# Patient Record
Sex: FEMALE | Race: BLACK OR AFRICAN AMERICAN | NOT HISPANIC OR LATINO | Employment: OTHER | URBAN - METROPOLITAN AREA
[De-identification: names, ages, dates, MRNs, and addresses within clinical notes are randomized per-mention and may not be internally consistent; named-entity substitution may affect disease eponyms.]

---

## 2023-03-09 DIAGNOSIS — Z00.00 HEALTH CARE MAINTENANCE: Primary | ICD-10-CM

## 2023-03-16 RX ORDER — LACTOSE-REDUCED FOOD
LIQUID (ML) ORAL
Qty: 14220 ML | Refills: 3 | Status: SHIPPED | OUTPATIENT
Start: 2023-03-16

## 2023-04-04 DIAGNOSIS — E78.5 HYPERLIPIDEMIA, UNSPECIFIED HYPERLIPIDEMIA TYPE: Primary | ICD-10-CM

## 2023-04-04 RX ORDER — ROSUVASTATIN CALCIUM 10 MG/1
TABLET, COATED ORAL
Qty: 90 TABLET | Refills: 0 | Status: SHIPPED | OUTPATIENT
Start: 2023-04-04 | End: 2023-07-06

## 2023-05-19 ENCOUNTER — TELEPHONE (OUTPATIENT)
Dept: PRIMARY CARE | Facility: CLINIC | Age: 88
End: 2023-05-19

## 2023-05-23 DIAGNOSIS — R53.83 FATIGUE, UNSPECIFIED TYPE: Primary | ICD-10-CM

## 2023-06-01 DIAGNOSIS — E03.8 OTHER SPECIFIED HYPOTHYROIDISM: ICD-10-CM

## 2023-06-03 RX ORDER — LEVOTHYROXINE SODIUM 112 UG/1
112 TABLET ORAL DAILY
Qty: 90 TABLET | Refills: 0 | Status: SHIPPED | OUTPATIENT
Start: 2023-06-03 | End: 2023-08-30

## 2023-06-21 ENCOUNTER — TELEPHONE (OUTPATIENT)
Dept: PRIMARY CARE | Facility: CLINIC | Age: 88
End: 2023-06-21
Payer: MEDICARE

## 2023-07-03 DIAGNOSIS — E78.5 HYPERLIPIDEMIA, UNSPECIFIED HYPERLIPIDEMIA TYPE: ICD-10-CM

## 2023-07-06 RX ORDER — ROSUVASTATIN CALCIUM 10 MG/1
TABLET, COATED ORAL
Qty: 90 TABLET | Refills: 0 | Status: SHIPPED | OUTPATIENT
Start: 2023-07-06 | End: 2023-10-02

## 2023-07-14 ENCOUNTER — CLINICAL SUPPORT (OUTPATIENT)
Dept: PRIMARY CARE | Facility: CLINIC | Age: 88
End: 2023-07-14
Payer: MEDICARE

## 2023-07-14 ENCOUNTER — TELEPHONE (OUTPATIENT)
Dept: PRIMARY CARE | Facility: CLINIC | Age: 88
End: 2023-07-14

## 2023-07-14 DIAGNOSIS — R53.83 FATIGUE, UNSPECIFIED TYPE: ICD-10-CM

## 2023-07-14 DIAGNOSIS — R32 URINARY INCONTINENCE, UNSPECIFIED TYPE: Primary | ICD-10-CM

## 2023-07-14 LAB
ALANINE AMINOTRANSFERASE (SGPT) (U/L) IN SER/PLAS: 13 U/L (ref 7–45)
ALBUMIN (G/DL) IN SER/PLAS: 4.4 G/DL (ref 3.4–5)
ALKALINE PHOSPHATASE (U/L) IN SER/PLAS: 66 U/L (ref 33–136)
ANION GAP IN SER/PLAS: 15 MMOL/L (ref 10–20)
ASPARTATE AMINOTRANSFERASE (SGOT) (U/L) IN SER/PLAS: 21 U/L (ref 9–39)
BASOPHILS (10*3/UL) IN BLOOD BY AUTOMATED COUNT: 0.02 X10E9/L (ref 0–0.1)
BASOPHILS/100 LEUKOCYTES IN BLOOD BY AUTOMATED COUNT: 0.3 % (ref 0–2)
BILIRUBIN TOTAL (MG/DL) IN SER/PLAS: 0.7 MG/DL (ref 0–1.2)
CALCIUM (MG/DL) IN SER/PLAS: 10.5 MG/DL (ref 8.6–10.6)
CARBON DIOXIDE, TOTAL (MMOL/L) IN SER/PLAS: 26 MMOL/L (ref 21–32)
CHLORIDE (MMOL/L) IN SER/PLAS: 105 MMOL/L (ref 98–107)
CREATININE (MG/DL) IN SER/PLAS: 0.73 MG/DL (ref 0.5–1.05)
EOSINOPHILS (10*3/UL) IN BLOOD BY AUTOMATED COUNT: 0.1 X10E9/L (ref 0–0.4)
EOSINOPHILS/100 LEUKOCYTES IN BLOOD BY AUTOMATED COUNT: 1.6 % (ref 0–6)
ERYTHROCYTE DISTRIBUTION WIDTH (RATIO) BY AUTOMATED COUNT: 14.7 % (ref 11.5–14.5)
ERYTHROCYTE MEAN CORPUSCULAR HEMOGLOBIN CONCENTRATION (G/DL) BY AUTOMATED: 30.3 G/DL (ref 32–36)
ERYTHROCYTE MEAN CORPUSCULAR VOLUME (FL) BY AUTOMATED COUNT: 96 FL (ref 80–100)
ERYTHROCYTES (10*6/UL) IN BLOOD BY AUTOMATED COUNT: 4.11 X10E12/L (ref 4–5.2)
GFR FEMALE: 77 ML/MIN/1.73M2
GLUCOSE (MG/DL) IN SER/PLAS: 80 MG/DL (ref 74–99)
HEMATOCRIT (%) IN BLOOD BY AUTOMATED COUNT: 39.3 % (ref 36–46)
HEMOGLOBIN (G/DL) IN BLOOD: 11.9 G/DL (ref 12–16)
IMMATURE GRANULOCYTES/100 LEUKOCYTES IN BLOOD BY AUTOMATED COUNT: 0.2 % (ref 0–0.9)
LEUKOCYTES (10*3/UL) IN BLOOD BY AUTOMATED COUNT: 6.1 X10E9/L (ref 4.4–11.3)
LYMPHOCYTES (10*3/UL) IN BLOOD BY AUTOMATED COUNT: 2.55 X10E9/L (ref 0.8–3)
LYMPHOCYTES/100 LEUKOCYTES IN BLOOD BY AUTOMATED COUNT: 41.7 % (ref 13–44)
MONOCYTES (10*3/UL) IN BLOOD BY AUTOMATED COUNT: 0.56 X10E9/L (ref 0.05–0.8)
MONOCYTES/100 LEUKOCYTES IN BLOOD BY AUTOMATED COUNT: 9.2 % (ref 2–10)
NEUTROPHILS (10*3/UL) IN BLOOD BY AUTOMATED COUNT: 2.87 X10E9/L (ref 1.6–5.5)
NEUTROPHILS/100 LEUKOCYTES IN BLOOD BY AUTOMATED COUNT: 47 % (ref 40–80)
NRBC (PER 100 WBCS) BY AUTOMATED COUNT: 0 /100 WBC (ref 0–0)
PLATELETS (10*3/UL) IN BLOOD AUTOMATED COUNT: 185 X10E9/L (ref 150–450)
POTASSIUM (MMOL/L) IN SER/PLAS: 4.4 MMOL/L (ref 3.5–5.3)
PROTEIN TOTAL: 7.7 G/DL (ref 6.4–8.2)
SODIUM (MMOL/L) IN SER/PLAS: 142 MMOL/L (ref 136–145)
THYROTROPIN (MIU/L) IN SER/PLAS BY DETECTION LIMIT <= 0.05 MIU/L: 3.27 MIU/L (ref 0.44–3.98)
UREA NITROGEN (MG/DL) IN SER/PLAS: 12 MG/DL (ref 6–23)

## 2023-07-14 PROCEDURE — 80053 COMPREHEN METABOLIC PANEL: CPT

## 2023-07-14 PROCEDURE — 85025 COMPLETE CBC W/AUTO DIFF WBC: CPT

## 2023-07-14 PROCEDURE — 84443 ASSAY THYROID STIM HORMONE: CPT

## 2023-08-30 DIAGNOSIS — E03.8 OTHER SPECIFIED HYPOTHYROIDISM: ICD-10-CM

## 2023-08-30 RX ORDER — LEVOTHYROXINE SODIUM 112 UG/1
112 TABLET ORAL DAILY
Qty: 90 TABLET | Refills: 1 | Status: SHIPPED | OUTPATIENT
Start: 2023-08-30 | End: 2024-03-01 | Stop reason: SDUPTHER

## 2023-10-02 DIAGNOSIS — E78.5 HYPERLIPIDEMIA, UNSPECIFIED HYPERLIPIDEMIA TYPE: ICD-10-CM

## 2023-10-02 RX ORDER — ROSUVASTATIN CALCIUM 10 MG/1
10 TABLET, COATED ORAL DAILY
Qty: 90 TABLET | Refills: 0 | Status: SHIPPED | OUTPATIENT
Start: 2023-10-02 | End: 2024-01-03

## 2023-12-01 ENCOUNTER — OFFICE VISIT (OUTPATIENT)
Dept: PRIMARY CARE | Facility: CLINIC | Age: 88
End: 2023-12-01
Payer: MEDICARE

## 2023-12-01 VITALS
HEART RATE: 71 BPM | DIASTOLIC BLOOD PRESSURE: 64 MMHG | BODY MASS INDEX: 27.75 KG/M2 | RESPIRATION RATE: 16 BRPM | OXYGEN SATURATION: 98 % | WEIGHT: 147 LBS | SYSTOLIC BLOOD PRESSURE: 112 MMHG | HEIGHT: 61 IN

## 2023-12-01 DIAGNOSIS — I10 HYPERTENSION, UNSPECIFIED TYPE: ICD-10-CM

## 2023-12-01 DIAGNOSIS — Z00.00 MEDICARE ANNUAL WELLNESS VISIT, SUBSEQUENT: Primary | ICD-10-CM

## 2023-12-01 DIAGNOSIS — E03.9 HYPOTHYROIDISM, UNSPECIFIED TYPE: ICD-10-CM

## 2023-12-01 DIAGNOSIS — E53.8 VITAMIN B12 DEFICIENCY: ICD-10-CM

## 2023-12-01 DIAGNOSIS — Z78.0 MENOPAUSE: ICD-10-CM

## 2023-12-01 DIAGNOSIS — Z12.31 ENCOUNTER FOR SCREENING MAMMOGRAM FOR MALIGNANT NEOPLASM OF BREAST: ICD-10-CM

## 2023-12-01 DIAGNOSIS — E78.5 HYPERLIPIDEMIA, UNSPECIFIED HYPERLIPIDEMIA TYPE: ICD-10-CM

## 2023-12-01 PROBLEM — N39.46 MIXED STRESS AND URGE URINARY INCONTINENCE: Status: ACTIVE | Noted: 2023-12-01

## 2023-12-01 PROBLEM — M17.0 PRIMARY OSTEOARTHRITIS OF BOTH KNEES: Status: ACTIVE | Noted: 2022-06-17

## 2023-12-01 PROBLEM — M85.80 OSTEOPENIA: Status: ACTIVE | Noted: 2023-12-01

## 2023-12-01 PROCEDURE — 3074F SYST BP LT 130 MM HG: CPT | Performed by: SPECIALIST

## 2023-12-01 PROCEDURE — 1036F TOBACCO NON-USER: CPT | Performed by: SPECIALIST

## 2023-12-01 PROCEDURE — 1159F MED LIST DOCD IN RCRD: CPT | Performed by: SPECIALIST

## 2023-12-01 PROCEDURE — 1160F RVW MEDS BY RX/DR IN RCRD: CPT | Performed by: SPECIALIST

## 2023-12-01 PROCEDURE — 1170F FXNL STATUS ASSESSED: CPT | Performed by: SPECIALIST

## 2023-12-01 PROCEDURE — 3078F DIAST BP <80 MM HG: CPT | Performed by: SPECIALIST

## 2023-12-01 PROCEDURE — G0439 PPPS, SUBSEQ VISIT: HCPCS | Performed by: SPECIALIST

## 2023-12-01 PROCEDURE — 99397 PER PM REEVAL EST PAT 65+ YR: CPT | Performed by: SPECIALIST

## 2023-12-01 RX ORDER — AMLODIPINE BESYLATE 5 MG/1
1 TABLET ORAL DAILY
COMMUNITY
Start: 2021-12-14

## 2023-12-01 RX ORDER — CHOLECALCIFEROL (VITAMIN D3) 50 MCG
2000 TABLET ORAL
COMMUNITY
Start: 2020-11-29

## 2023-12-01 ASSESSMENT — ENCOUNTER SYMPTOMS
OCCASIONAL FEELINGS OF UNSTEADINESS: 0
LOSS OF SENSATION IN FEET: 0
DEPRESSION: 0

## 2023-12-01 ASSESSMENT — ACTIVITIES OF DAILY LIVING (ADL)
DRESSING: INDEPENDENT
DOING_HOUSEWORK: NEEDS ASSISTANCE
TAKING_MEDICATION: INDEPENDENT
GROCERY_SHOPPING: NEEDS ASSISTANCE
BATHING: INDEPENDENT
MANAGING_FINANCES: INDEPENDENT

## 2023-12-01 ASSESSMENT — PATIENT HEALTH QUESTIONNAIRE - PHQ9
SUM OF ALL RESPONSES TO PHQ9 QUESTIONS 1 AND 2: 0
SUM OF ALL RESPONSES TO PHQ9 QUESTIONS 1 AND 2: 0
1. LITTLE INTEREST OR PLEASURE IN DOING THINGS: NOT AT ALL
2. FEELING DOWN, DEPRESSED OR HOPELESS: NOT AT ALL
SUM OF ALL RESPONSES TO PHQ9 QUESTIONS 1 AND 2: 0
1. LITTLE INTEREST OR PLEASURE IN DOING THINGS: NOT AT ALL
1. LITTLE INTEREST OR PLEASURE IN DOING THINGS: NOT AT ALL
2. FEELING DOWN, DEPRESSED OR HOPELESS: NOT AT ALL
2. FEELING DOWN, DEPRESSED OR HOPELESS: NOT AT ALL

## 2023-12-01 NOTE — PATIENT INSTRUCTIONS
Recommend Covid Vaccine  Recommend RSV vaccine (separate from other vaccines by 2 weeks)  Recommend Prevnar 20   Recommend Shingrix vaccines

## 2023-12-01 NOTE — PROGRESS NOTES
Problem: At Risk for Falls  Goal: # Patient does not fall  Outcome: Outcome Not Met, Plan Adjusted  Bed alarm on, fall light turned on, fall risk sign on door, and call light within reach. Reinforced fall prevention and to call staff before getting out of bed.        Subjective   Patient ID: Moriah Quintanilla is a 89 y.o. female who presents for Medicare Annual Wellness Visit Initial.  HPI    89 yo female Pmhx sig for Hypertension, TIA 1999, Hypothyroidism, MODESTO (Not using CPAP regularly has nasal pillows), B12 Deficiency, Falls and OA Knee presents for annual wellness     Feeling ok  Walking better, knee is much better, using cane    Had bad shoulder pain and BP was up to 200 something  Wonders why on BP meds, said she never had until shoulder was bad    32 yo granddaughter wants her to live with her but she declined    Issue with daughter right now so sticky family matters  Walking in house  Walking with rollator 1-2 x a week weather permitting outside  Uses shower stool    Does water exercise also     No Known Allergies   Current Outpatient Medications   Medication Instructions    amLODIPine (Norvasc) 5 mg tablet 1 tablet, oral, Daily    cholecalciferol (VITAMIN D-3) 2,000 Units, oral    incontinence pad, liner, disp pad 1 each, miscellaneous, 4 times daily PRN    levothyroxine (SYNTHROID, LEVOXYL) 112 mcg, oral, Daily    nutritional drink (Ensure) liquid DRINK ONE CAN IN THE MORNING AND ONE IN THE EVENING    rosuvastatin (CRESTOR) 10 mg, oral, Daily        Review of Systems  Constitutional  No fatigue, no fevers, no chills, no unintentional weight loss,   HEENT:  No headaches, no dizziness, no double vision, no blurred vision, eye exams current decreased right hearing  Cardiovascular:  No chest pain, no palpitations, no shortness of breath with exertion (walking a city block),   Respiratory:  No cough, no hemoptysis, No shortness of breath at rest  GI:  No dysphagia, no odynophagia, no reflux, no abdominal pain, no nausea, no vomiting, no changes in bowel habits, no bright red blood per rectum, no melena takes metamucil due to constipation  :  No urinary frequency, no dysuria, Positive using depends urine incontinence  MSK:  No falls, no joint pain, no joint swelling  Neuro:  No  "tremors, no extremity weakness, no changes in sensation    Physical Exam  /64   Pulse 71   Resp 16   Ht 1.549 m (5' 1\")   Wt 66.7 kg (147 lb)   SpO2 98%   BMI 27.78 kg/m²   General:    Well-appearing  F in no acute distress, well nourished, well hydrated  Head:  Normocephalic, atraumatic  Skin:          Warm dry,   Eyes:  Anicteric sclera, pupils equal,   Ears:        TMs intact  Oral:      Not examined due to pandemic  Neck:   Supple, no cervical/supraclavicular adenopathy, no thyromegaly or nodules appreciated on exam  Cor:      Regular rate, normal S1, S2, no murmurs appreciated, no S3, no S4   Lungs:   Clear to auscultation b/l, no wheezes, no rhonchi, no crackles, no accessory respiratory muscle use  Abd:          Soft, nontender, no guarding, no rebound, no hepatosplenomegaly appreciated   Ext:            No lower extremity edema, no palpable cords  Pulses:      Pedal pulses intact  Neuro:   CN2-12 grossly intact   Breasts:     Not examined declined     Assessment/Plan   Problem List Items Addressed This Visit       Vitamin B12 deficiency     Not currently taking b12  Labs ordered         Relevant Orders    Vitamin B12    Hypothyroidism     On 112 mcgs levothyroxine  Labs ordered           Relevant Orders    Thyroid Stimulating Hormone    Hyperlipidemia     On Crestor 10 mgdaily  Labs ordered          Relevant Orders    Comprehensive Metabolic Panel    Lipid Panel    Medicare annual wellness visit, subsequent - Primary     Previously received annual influenza vcaccine  Recommend RSV vaccine  Recommended Covid vaccine  Prior Tdap 2020  Recommend Shingrix vaccines  Previously received PCV13 in 12/2018  Recommended Prevnar 20   Mammogram 3/14/2022 ordered  DXA 3/12/2021 osteopenia           Relevant Orders    CBC    Comprehensive Metabolic Panel    Lipid Panel    Thyroid Stimulating Hormone    Vitamin B12    Hypertension     Well controlled on current medication         Relevant Orders    CBC    " Comprehensive Metabolic Panel     Other Visit Diagnoses       Menopause        Relevant Orders    XR DEXA bone density    Encounter for screening mammogram for malignant neoplasm of breast        Relevant Orders    BI mammo bilateral screening tomosynthesis             Susu Sierra DO

## 2023-12-12 PROBLEM — I10 HYPERTENSION: Status: ACTIVE | Noted: 2023-12-12

## 2023-12-13 NOTE — ASSESSMENT & PLAN NOTE
Previously received annual influenza vcaccine  Recommend RSV vaccine  Recommended Covid vaccine  Prior Tdap 2020  Recommend Shingrix vaccines  Previously received PCV13 in 12/2018  Recommended Prevnar 20   Mammogram 3/14/2022 ordered  DXA 3/12/2021 osteopenia

## 2024-01-01 DIAGNOSIS — E78.5 HYPERLIPIDEMIA, UNSPECIFIED HYPERLIPIDEMIA TYPE: ICD-10-CM

## 2024-01-03 RX ORDER — ROSUVASTATIN CALCIUM 10 MG/1
10 TABLET, COATED ORAL DAILY
Qty: 90 TABLET | Refills: 3 | Status: SHIPPED | OUTPATIENT
Start: 2024-01-03 | End: 2024-03-01 | Stop reason: SDUPTHER

## 2024-01-19 ENCOUNTER — APPOINTMENT (OUTPATIENT)
Dept: SLEEP MEDICINE | Facility: CLINIC | Age: 89
End: 2024-01-19
Payer: MEDICARE

## 2024-03-01 DIAGNOSIS — E03.8 OTHER SPECIFIED HYPOTHYROIDISM: ICD-10-CM

## 2024-03-01 DIAGNOSIS — E78.5 HYPERLIPIDEMIA, UNSPECIFIED HYPERLIPIDEMIA TYPE: ICD-10-CM

## 2024-03-01 RX ORDER — ROSUVASTATIN CALCIUM 10 MG/1
10 TABLET, COATED ORAL DAILY
Qty: 90 TABLET | Refills: 0 | Status: SHIPPED | OUTPATIENT
Start: 2024-03-01 | End: 2024-05-31

## 2024-03-01 RX ORDER — LEVOTHYROXINE SODIUM 112 UG/1
112 TABLET ORAL DAILY
Qty: 90 TABLET | Refills: 0 | Status: SHIPPED | OUTPATIENT
Start: 2024-03-01 | End: 2024-05-31

## 2024-03-18 ENCOUNTER — APPOINTMENT (OUTPATIENT)
Dept: RADIOLOGY | Facility: CLINIC | Age: 89
End: 2024-03-18
Payer: MEDICARE

## 2024-04-03 ENCOUNTER — TELEPHONE (OUTPATIENT)
Dept: PRIMARY CARE | Facility: CLINIC | Age: 89
End: 2024-04-03

## 2024-04-03 NOTE — TELEPHONE ENCOUNTER
Patient would like to know if you could recommend an inexpensive thyroid medication for her next refill. The last refill she had picked up in [place] was 35 dollars, she usually pays 9 dollars. I also told her it may be because she was [place] when she got it.

## 2024-04-08 ENCOUNTER — HOSPITAL ENCOUNTER (OUTPATIENT)
Dept: RADIOLOGY | Facility: CLINIC | Age: 89
Discharge: HOME | End: 2024-04-08
Payer: MEDICARE

## 2024-04-08 DIAGNOSIS — Z78.0 MENOPAUSE: ICD-10-CM

## 2024-04-08 DIAGNOSIS — Z12.31 ENCOUNTER FOR SCREENING MAMMOGRAM FOR MALIGNANT NEOPLASM OF BREAST: ICD-10-CM

## 2024-04-08 PROCEDURE — 77080 DXA BONE DENSITY AXIAL: CPT

## 2024-04-08 PROCEDURE — 77080 DXA BONE DENSITY AXIAL: CPT | Performed by: RADIOLOGY

## 2024-04-10 ENCOUNTER — HOSPITAL ENCOUNTER (OUTPATIENT)
Dept: RADIOLOGY | Facility: CLINIC | Age: 89
Discharge: HOME | End: 2024-04-10
Payer: MEDICARE

## 2024-04-10 VITALS — BODY MASS INDEX: 27.76 KG/M2 | HEIGHT: 61 IN | WEIGHT: 147.05 LBS

## 2024-04-10 PROCEDURE — 77063 BREAST TOMOSYNTHESIS BI: CPT | Performed by: RADIOLOGY

## 2024-04-10 PROCEDURE — 77067 SCR MAMMO BI INCL CAD: CPT | Performed by: RADIOLOGY

## 2024-04-10 PROCEDURE — 77067 SCR MAMMO BI INCL CAD: CPT

## 2024-04-18 ENCOUNTER — APPOINTMENT (OUTPATIENT)
Dept: SLEEP MEDICINE | Facility: HOSPITAL | Age: 89
End: 2024-04-18
Payer: MEDICARE

## 2024-05-01 ENCOUNTER — TELEPHONE (OUTPATIENT)
Dept: PRIMARY CARE | Facility: CLINIC | Age: 89
End: 2024-05-01

## 2024-05-01 DIAGNOSIS — R26.9 GAIT ABNORMALITY: Primary | ICD-10-CM

## 2024-05-01 NOTE — TELEPHONE ENCOUNTER
Patient would like prescription for handicap placard. She is unable to walk 200 ft. Without stopping and uses a cane and rolator device.

## 2024-05-29 DIAGNOSIS — E78.5 HYPERLIPIDEMIA, UNSPECIFIED HYPERLIPIDEMIA TYPE: ICD-10-CM

## 2024-05-29 DIAGNOSIS — E03.8 OTHER SPECIFIED HYPOTHYROIDISM: ICD-10-CM

## 2024-05-31 RX ORDER — LEVOTHYROXINE SODIUM 112 UG/1
112 TABLET ORAL DAILY
Qty: 90 TABLET | Refills: 1 | Status: SHIPPED | OUTPATIENT
Start: 2024-05-31

## 2024-05-31 RX ORDER — ROSUVASTATIN CALCIUM 10 MG/1
10 TABLET, COATED ORAL DAILY
Qty: 90 TABLET | Refills: 1 | Status: SHIPPED | OUTPATIENT
Start: 2024-05-31

## 2024-07-10 ENCOUNTER — APPOINTMENT (OUTPATIENT)
Dept: SLEEP MEDICINE | Facility: CLINIC | Age: 89
End: 2024-07-10
Payer: MEDICARE

## 2024-07-10 VITALS
HEART RATE: 71 BPM | HEIGHT: 60 IN | BODY MASS INDEX: 29.84 KG/M2 | RESPIRATION RATE: 16 BRPM | SYSTOLIC BLOOD PRESSURE: 133 MMHG | WEIGHT: 152 LBS | DIASTOLIC BLOOD PRESSURE: 67 MMHG | OXYGEN SATURATION: 96 %

## 2024-07-10 DIAGNOSIS — G47.33 OSA (OBSTRUCTIVE SLEEP APNEA): ICD-10-CM

## 2024-07-10 DIAGNOSIS — I10 HYPERTENSION, UNSPECIFIED TYPE: Primary | ICD-10-CM

## 2024-07-10 PROCEDURE — 3078F DIAST BP <80 MM HG: CPT | Performed by: STUDENT IN AN ORGANIZED HEALTH CARE EDUCATION/TRAINING PROGRAM

## 2024-07-10 PROCEDURE — G2211 COMPLEX E/M VISIT ADD ON: HCPCS | Performed by: STUDENT IN AN ORGANIZED HEALTH CARE EDUCATION/TRAINING PROGRAM

## 2024-07-10 PROCEDURE — 1159F MED LIST DOCD IN RCRD: CPT | Performed by: STUDENT IN AN ORGANIZED HEALTH CARE EDUCATION/TRAINING PROGRAM

## 2024-07-10 PROCEDURE — 3075F SYST BP GE 130 - 139MM HG: CPT | Performed by: STUDENT IN AN ORGANIZED HEALTH CARE EDUCATION/TRAINING PROGRAM

## 2024-07-10 PROCEDURE — 1036F TOBACCO NON-USER: CPT | Performed by: STUDENT IN AN ORGANIZED HEALTH CARE EDUCATION/TRAINING PROGRAM

## 2024-07-10 PROCEDURE — 1160F RVW MEDS BY RX/DR IN RCRD: CPT | Performed by: STUDENT IN AN ORGANIZED HEALTH CARE EDUCATION/TRAINING PROGRAM

## 2024-07-10 PROCEDURE — 99214 OFFICE O/P EST MOD 30 MIN: CPT | Performed by: STUDENT IN AN ORGANIZED HEALTH CARE EDUCATION/TRAINING PROGRAM

## 2024-07-10 ASSESSMENT — ENCOUNTER SYMPTOMS
RESPIRATORY NEGATIVE: 1
PSYCHIATRIC NEGATIVE: 1
NEUROLOGICAL NEGATIVE: 1
CARDIOVASCULAR NEGATIVE: 1
CONSTITUTIONAL NEGATIVE: 1

## 2024-07-10 NOTE — ASSESSMENT & PLAN NOTE
BP Readings from Last 1 Encounters:   07/10/24 133/67     - doing well, asymptomatic  - discussed at length the impact of untreated MODESTO and BP control  - continue current management and follow-up with PCP

## 2024-07-10 NOTE — ASSESSMENT & PLAN NOTE
Has her machine. Did well initially and found it helpful, then she went to [place] for 4 months and forgot to bring it with her, then she just stopped using  Resumed about 2 nights ago and found it very help  Ready to start again

## 2024-07-10 NOTE — PROGRESS NOTES
Patient: Moriah Quintanilla    [id number]  : 8/3/1931 -- AGE 89 y.o.    Provider: Js Rondon MD     Location [facility]   Service Date: 7/10/2024              [facility]  Followup Visit Note    HISTORY OF PRESENT ILLNESS     HISTORY OF PRESENT ILLNESS   Moriah Quintanilla is a 89 y.o. female with h/o Hypertension and MODESTO who presents to a [facility] for followup.     Assessment and plan from last visit: 2023    89 year old female here for sleep consultation     # MODESTO, moderate (RDI 16.9 on PSG 2023)  - PSG reviewed and treatement options discussed in detail with patient  - Start auto PAP 4-10 CWP through [facility]  - patient voiced understanding  - overall very functional 89 yo. We will obtain the study first then evaluate to see if we want to treat  - she will bring her old machine with her to f/u visit with me in 3 weeks     # HTN  - /77 in clinic  - doing well, asymptomatic  - discussed at length the impact of untreated MODESTO and BP control  - continue current management and follow-up with PCP     # Obesity  - current BMI at ~ 30  - encouraged healthy weight loss via diet and exercise     RTC in 3 months     Current History    On today's visit, the patient reports that she did well with the CPAP therapy initially, then went to [facility] to visit her son and stayed there for 4 months without her CPAP.  She didn't resume using the therapy after she got back until 2 nights ago where she found it very beneficial.  She wants to start again.    PAP Info  DURABLE MEDICAL EQUIPMENT COMPANY: [employer]  Machine: THERAPY: RESMED AIRSENSE 11  4 cm H2O - 10 cm H2O  Issues with therapy: ISSUES WITH THERAPY: None  Benefits with PAP: PERCEIVED BENEFITS OF PAP: refreshing sleep    RLS Followup:   none.    Daytime Symptoms    Patient reports DAYTIME SYMPTOMS: no daytime symptoms  Patient denies daytime symptoms including: Denies:  excessive daytime sleepiness    Naps: No  Fatigue: denies feeling fatigue      REVIEW OF SYSTEMS     REVIEW OF SYSTEMS  Review of Systems   Constitutional: Negative.    HENT: Negative.     Respiratory: Negative.     Cardiovascular: Negative.    Genitourinary: Negative.    Skin: Negative.    Neurological: Negative.    Psychiatric/Behavioral: Negative.           ALLERGIES AND MEDICATIONS     ALLERGIES  No Known Allergies    MEDICATIONS: She has a current medication list which includes the following prescription(s): amlodipine - Take 1 tablet (5 mg) by mouth once daily, cholecalciferol - Take 1 tablet (2,000 Units) by mouth, incontinence pad, liner, disp - 1 each 4 times a day as needed (incontnence), levothyroxine - Take 1 tablet (112 mcg) by mouth once daily, ensure - DRINK ONE CAN IN THE MORNING AND ONE IN THE EVENING, and rosuvastatin - Take 1 tablet (10 mg) by mouth once daily.    PAST MEDICAL HISTORY : She  has a past medical history of Acute upper respiratory infection, unspecified (07/25/2019), Age-related osteoporosis without current pathological fracture (02/25/2021), Encounter for examination of ears and hearing without abnormal findings (09/30/2019), Encounter for immunization, Encounter for screening mammogram for malignant neoplasm of breast (04/25/2017), Localized edema (04/11/2022), Localized edema (05/19/2022), Obstructive sleep apnea (adult) (pediatric) (01/13/2020), Olecranon bursitis, unspecified elbow (11/29/2020), Other specified abnormal findings of blood chemistry (12/30/2021), Pain in unspecified knee (10/18/2022), Personal history of other diseases of the nervous system and sense organs (07/28/2019), Personal history of other drug therapy (12/05/2018), Personal history of other drug therapy (10/13/2016), Personal history of other drug therapy (10/26/2018), Personal history of other endocrine, nutritional and metabolic disease (04/25/2013), Personal history of other infectious and parasitic  diseases (04/12/2020), Personal history of other mental and behavioral disorders (04/25/2013), Personal history of other specified conditions (09/21/2016), Personal history of other specified conditions (04/02/2020), Personal history of other specified conditions (03/22/2021), Personal history of urinary (tract) infections (06/18/2018), Personal history of urinary (tract) infections (08/27/2021), Personal history of urinary (tract) infections (03/12/2013), Rash and other nonspecific skin eruption (10/13/2016), Strain of muscle, fascia and tendon at neck level, initial encounter (01/15/2019), Strain of right quadriceps muscle, fascia and tendon, initial encounter (10/17/2022), Unilateral post-traumatic osteoarthritis, left knee (10/31/2018), Unspecified fall, initial encounter (10/31/2022), Unspecified internal derangement of left knee (04/02/2020), and Zoster without complications (10/06/2015).    PAST SURGICAL HISTORY: She  has a past surgical history that includes Other surgical history (12/05/2018) and Ankle surgery (07/18/2016).     FAMILY HISTORY: No changes since previous visit. Otherwise non-contributory as charted.     SOCIAL HISTORY  She  reports that she has never smoked. She has never used smokeless tobacco. She reports current alcohol use of about 1.0 standard drink of alcohol per week. She reports that she does not use drugs.       PHYSICAL EXAM     VITAL SIGNS: /67 (BP Location: Left arm, Patient Position: Sitting, BP Cuff Size: Adult)   Pulse 71   Resp 16   Ht 1.524 m (5')   Wt 68.9 kg (152 lb)   SpO2 96%   BMI 29.69 kg/m²      PREVIOUS WEIGHTS:  Wt Readings from Last 3 Encounters:   07/10/24 68.9 kg (152 lb)   04/10/24 66.7 kg (147 lb 0.8 oz)   12/01/23 66.7 kg (147 lb)         RESULTS/DATA     Bicarbonate (mmol/L)   Date Value   07/14/2023 26   10/31/2022 28   03/01/2021 27       PAP Adherence  A PAP adherence download was obtained and data was reviewed personally today in  clinic.        ASSESSMENT/PLAN     Ms. Quintanilla is a 89 y.o. female and she returns in followup to the [facility] for MODESTO.    Problem List, Orders, Assessment, Recommendations:  Problem List Items Addressed This Visit             ICD-10-CM    Hypertension - Primary I10     BP Readings from Last 1 Encounters:   07/10/24 133/67     - doing well, asymptomatic  - discussed at length the impact of untreated MODESTO and BP control  - continue current management and follow-up with PCP          MODESTO (obstructive sleep apnea) G47.33     Has her machine. Did well initially and found it helpful, then she went to [place] for 4 months and forgot to bring it with her, then she just stopped using  Resumed about 2 nights ago and found it very help  Ready to start again         Relevant Orders    Positive Airway Pressure (PAP) Therapy       Disposition    Return to clinic in 4 months

## 2024-07-15 ENCOUNTER — APPOINTMENT (OUTPATIENT)
Dept: ORTHOPEDIC SURGERY | Facility: CLINIC | Age: 89
End: 2024-07-15
Payer: MEDICARE

## 2024-07-16 ENCOUNTER — APPOINTMENT (OUTPATIENT)
Dept: ORTHOPEDIC SURGERY | Facility: CLINIC | Age: 89
End: 2024-07-16
Payer: MEDICARE

## 2024-07-17 ENCOUNTER — HOSPITAL ENCOUNTER (OUTPATIENT)
Dept: RADIOLOGY | Facility: CLINIC | Age: 89
Discharge: HOME | End: 2024-07-17
Payer: MEDICARE

## 2024-07-17 ENCOUNTER — OFFICE VISIT (OUTPATIENT)
Dept: ORTHOPEDIC SURGERY | Facility: CLINIC | Age: 89
End: 2024-07-17
Payer: MEDICARE

## 2024-07-17 DIAGNOSIS — M54.2 NECK PAIN: ICD-10-CM

## 2024-07-17 DIAGNOSIS — M54.12 CERVICAL RADICULOPATHY: ICD-10-CM

## 2024-07-17 PROCEDURE — 72050 X-RAY EXAM NECK SPINE 4/5VWS: CPT

## 2024-07-17 PROCEDURE — 72050 X-RAY EXAM NECK SPINE 4/5VWS: CPT | Performed by: RADIOLOGY

## 2024-07-17 NOTE — PROGRESS NOTES
Moriah is a 89-year-old female that presents today with her daughter to be evaluated for a cracking noise in her neck.    She denies any injury or fall.  She does not have any upper extremity radiculopathy or weakness.  No neck pain.  She has full control of her bowel and bladder to extent with known incontinence that is being worked up.  She is doing Kegel exercises.  She ambulates without assistance.    She is an extremely healthy and active 89-year-old.    From a treatment standpoint she has not done any physical therapy for this.    Family, social, and medical histories are obtained and reviewed.    I reviewed the complete 30-point review of systems that was documented on the scanned patient intake form.  All other systems are non-contributory except as defined in history of present illness.    Const: Well-appearing, well-nourished female in no distress.  Eyes: Normal appearing sclera and conjunctiva, no jaundice, pupils normal in appearance.  Neck: No masses or lymphadenopathy appreciated.  Resp: breathing comfortably, normal respiratory rate rate.  CV: No upper or lower extremity edema.  Musculoskeletal: Normal gait.  Able to heel/toe walk without difficulty.  Cervical ROM is supple.  Strength exam of the upper extremities reveals 5/5 strength in all major muscle groups .  No intrinsic wasting.  Negative Spurling sign.    Neuro: Sensation is intact and equal bilaterally. Deep tendon reflexes are normal and symmetric.  No clonus, negative Reaves sign, negative Lhermitte's sign  Skin: Intact without any lesions, normal turgor.  Psych: Alert and oriented x3, normal mood and affect.    We will obtain x-rays of her cervical spine after the visit just to ensure from a structural standpoint that everything is okay but I did reassure her that most likely this is more of a arthritic, degenerative change condition.    Moving forward she was given a referral for physical therapy for general exercising and strengthening  and will follow-up with us as needed.    This note was dictated using speech recognition software and was not corrected for spelling or grammatical errors

## 2024-08-01 ENCOUNTER — APPOINTMENT (OUTPATIENT)
Dept: PRIMARY CARE | Facility: CLINIC | Age: 89
End: 2024-08-01
Payer: MEDICARE

## 2024-09-19 ENCOUNTER — HOSPITAL ENCOUNTER (EMERGENCY)
Facility: HOSPITAL | Age: 89
Discharge: HOME | End: 2024-09-19
Payer: MEDICARE

## 2024-09-19 ENCOUNTER — APPOINTMENT (OUTPATIENT)
Dept: RADIOLOGY | Facility: HOSPITAL | Age: 89
End: 2024-09-19
Payer: MEDICARE

## 2024-09-19 VITALS
DIASTOLIC BLOOD PRESSURE: 82 MMHG | HEART RATE: 67 BPM | TEMPERATURE: 98.7 F | RESPIRATION RATE: 18 BRPM | SYSTOLIC BLOOD PRESSURE: 158 MMHG | WEIGHT: 140 LBS | BODY MASS INDEX: 26.43 KG/M2 | OXYGEN SATURATION: 98 % | HEIGHT: 61 IN

## 2024-09-19 DIAGNOSIS — S09.90XA HEAD INJURY, INITIAL ENCOUNTER: Primary | ICD-10-CM

## 2024-09-19 PROCEDURE — 70450 CT HEAD/BRAIN W/O DYE: CPT | Performed by: RADIOLOGY

## 2024-09-19 PROCEDURE — 99285 EMERGENCY DEPT VISIT HI MDM: CPT | Mod: 25

## 2024-09-19 PROCEDURE — 72125 CT NECK SPINE W/O DYE: CPT

## 2024-09-19 PROCEDURE — 70450 CT HEAD/BRAIN W/O DYE: CPT

## 2024-09-19 PROCEDURE — 72125 CT NECK SPINE W/O DYE: CPT | Performed by: RADIOLOGY

## 2024-09-19 RX ORDER — ACETAMINOPHEN 325 MG/1
975 TABLET ORAL ONCE
Status: COMPLETED | OUTPATIENT
Start: 2024-09-19 | End: 2024-09-19

## 2024-09-19 RX ORDER — ACETAMINOPHEN 500 MG
1000 TABLET ORAL 2 TIMES DAILY
Qty: 28 TABLET | Refills: 0 | Status: SHIPPED | OUTPATIENT
Start: 2024-09-19 | End: 2024-09-26

## 2024-09-19 RX ADMIN — ACETAMINOPHEN 975 MG: 325 TABLET ORAL at 13:29

## 2024-09-19 ASSESSMENT — COLUMBIA-SUICIDE SEVERITY RATING SCALE - C-SSRS
2. HAVE YOU ACTUALLY HAD ANY THOUGHTS OF KILLING YOURSELF?: NO
1. IN THE PAST MONTH, HAVE YOU WISHED YOU WERE DEAD OR WISHED YOU COULD GO TO SLEEP AND NOT WAKE UP?: NO
6. HAVE YOU EVER DONE ANYTHING, STARTED TO DO ANYTHING, OR PREPARED TO DO ANYTHING TO END YOUR LIFE?: NO

## 2024-09-19 ASSESSMENT — PAIN - FUNCTIONAL ASSESSMENT
PAIN_FUNCTIONAL_ASSESSMENT: 0-10
PAIN_FUNCTIONAL_ASSESSMENT: 0-10

## 2024-09-19 ASSESSMENT — PAIN SCALES - GENERAL
PAINLEVEL_OUTOF10: 1
PAINLEVEL_OUTOF10: 0 - NO PAIN

## 2024-09-19 NOTE — ED PROVIDER NOTES
"HPI   Chief Complaint   Patient presents with    Head Injury       89-year-old female hit her head on the top of refrigerator door 5 days ago.  There was swelling at the top of her head and she went and saw her pharmacist who recommended putting a wet dish cloth in the microwave and heating it up and then putting a plastic bag and putting it on the top of her head.  Swelling did in fact improving go down.  She still has tenderness at this site.  When it first swelled it was a size of a \"cashew\".  She denies visual change.  She denies any neurologic deficit.  She denies nausea or vomiting.  She denies vision change.  She denies weakness.  She denies posterior neck pain.  She denies paresthesia of upper or lower extremities.  She denies chest pain, dyspnea, abdominal pain, nausea or vomiting and her 80-year-old son is bedside with her.  She is not on any anticoagulant medication.      History provided by:  Patient   used: No            Patient History   Past Medical History:   Diagnosis Date    Acute upper respiratory infection, unspecified 07/25/2019    Viral upper respiratory tract infection    Age-related osteoporosis without current pathological fracture 02/25/2021    Age related osteoporosis    Encounter for examination of ears and hearing without abnormal findings 09/30/2019    Encounter for audiology evaluation    Encounter for immunization     Encounter for immunization    Encounter for screening mammogram for malignant neoplasm of breast 04/25/2017    Screening mammogram for high-risk patient    Localized edema 04/11/2022    Unilateral edema of lower extremity    Localized edema 05/19/2022    Leg edema    Obstructive sleep apnea (adult) (pediatric) 01/13/2020    MODESTO on CPAP    Olecranon bursitis, unspecified elbow 11/29/2020    Olecranon bursitis    Other specified abnormal findings of blood chemistry 12/30/2021    Abnormal thyroid blood test    Pain in unspecified knee 10/18/2022    Knee " pain    Personal history of other diseases of the nervous system and sense organs 07/28/2019    History of conjunctivitis    Personal history of other drug therapy 12/05/2018    History of pneumococcal vaccination    Personal history of other drug therapy 10/13/2016    History of influenza vaccination    Personal history of other drug therapy 10/26/2018    History of influenza vaccination    Personal history of other endocrine, nutritional and metabolic disease 04/25/2013    History of hypothyroidism    Personal history of other infectious and parasitic diseases 04/12/2020    History of Clostridioides difficile colitis    Personal history of other mental and behavioral disorders 04/25/2013    History of depression    Personal history of other specified conditions 09/21/2016    History of urinary urgency    Personal history of other specified conditions 04/02/2020    History of paresthesia    Personal history of other specified conditions 03/22/2021    History of palpitations    Personal history of urinary (tract) infections 06/18/2018    History of urinary tract infection    Personal history of urinary (tract) infections 08/27/2021    History of urinary tract infection    Personal history of urinary (tract) infections 03/12/2013    History of urinary tract infection    Rash and other nonspecific skin eruption 10/13/2016    Rash    Strain of muscle, fascia and tendon at neck level, initial encounter 01/15/2019    Neck strain    Strain of right quadriceps muscle, fascia and tendon, initial encounter 10/17/2022    Strain of quadriceps, right, initial encounter    Unilateral post-traumatic osteoarthritis, left knee 10/31/2018    Post-traumatic osteoarthritis of left knee    Unspecified fall, initial encounter 10/31/2022    Fall, accidental    Unspecified internal derangement of left knee 04/02/2020    Internal derangement of left knee    Zoster without complications 10/06/2015    Herpes zoster without complication      Past Surgical History:   Procedure Laterality Date    ANKLE SURGERY  07/18/2016    Ankle Surgery    OTHER SURGICAL HISTORY  12/05/2018    Tonsillectomy     Family History   Problem Relation Name Age of Onset    Diabetes Mother      Coronary artery disease Father      Diabetes Brother       Social History     Tobacco Use    Smoking status: Never    Smokeless tobacco: Never   Vaping Use    Vaping status: Never Used   Substance Use Topics    Alcohol use: Yes     Alcohol/week: 1.0 standard drink of alcohol     Types: 1 Glasses of wine per week     Comment: rare small glass of blackberry [place]    Drug use: Never       Physical Exam   ED Triage Vitals [09/19/24 1206]   Temperature Heart Rate Respirations BP   37.1 °C (98.7 °F) 64 18 (!) 170/91      Pulse Ox Temp Source Heart Rate Source Patient Position   99 % Oral Monitor --      BP Location FiO2 (%)     -- --       Physical Exam  Constitutional:       Appearance: Normal appearance.   HENT:      Head: Normocephalic and atraumatic.      Right Ear: Tympanic membrane normal.      Left Ear: Tympanic membrane normal.      Nose: Nose normal.      Mouth/Throat:      Mouth: Mucous membranes are dry.   Eyes:      Extraocular Movements: Extraocular movements intact.      Pupils: Pupils are equal, round, and reactive to light.   Cardiovascular:      Rate and Rhythm: Normal rate and regular rhythm.      Pulses: Normal pulses.      Heart sounds: Normal heart sounds.   Pulmonary:      Effort: Pulmonary effort is normal.      Breath sounds: Normal breath sounds.   Abdominal:      General: Abdomen is flat.      Palpations: Abdomen is soft.   Musculoskeletal:         General: Normal range of motion.      Cervical back: Normal range of motion and neck supple.   Skin:     General: Skin is warm.      Capillary Refill: Capillary refill takes less than 2 seconds.   Neurological:      General: No focal deficit present.      Mental Status: She is alert and oriented to person, place,  and time.      Cranial Nerves: No cranial nerve deficit.      Sensory: No sensory deficit.      Motor: No weakness.      Coordination: Coordination normal.      Gait: Gait normal.   Psychiatric:         Mood and Affect: Mood normal.         Behavior: Behavior normal.           ED Course & MDM   Diagnoses as of 09/19/24 1506   Head injury, initial encounter                 No data recorded     Boni Coma Scale Score: 15 (09/19/24 1207 : Rob Narayanan RN)       NIH Stroke Scale: 0 (09/19/24 1242 : AMITA Dowell)                   Medical Decision Making  Physical exam was normal and NIH was 0.  Due to the nature of the head injury and patient describing a throbbing headache CT head and neck was ordered.  She received 975 acetaminophen.CT head and neck were negative for acute findings.  Patient responded well to acetaminophen.  She can use acetaminophen twice daily.  Follow-up with PCP as needed.  Return precautions were reviewed and if she develops acute headache, nausea or vomiting, or confusion it is 911 back to our emergency department.    Amount and/or Complexity of Data Reviewed  Radiology: ordered and independent interpretation performed.        Procedure  Procedures     AMITA Dowell  09/19/24 1506

## 2024-09-19 NOTE — ED TRIAGE NOTES
Pt arrives to ED for head injury that happened on Sunday. Pt was standing up and hit her head on the refrigerator door. Pt denies blood thinners or LOC. Pt states she noticed a lump on the top of her head and wanted a scan to make sure everything was okay.

## 2024-09-19 NOTE — DISCHARGE INSTRUCTIONS
Watch for any sign of nausea vomiting, confusion, or worst headache of your life.  If that occurs it is 911 immediately back to our ED.  Please use acetaminophen 1000 mg twice daily as needed for headache and pain.

## 2024-09-25 ENCOUNTER — APPOINTMENT (OUTPATIENT)
Dept: SLEEP MEDICINE | Facility: CLINIC | Age: 89
End: 2024-09-25
Payer: MEDICARE

## 2024-09-25 VITALS
BODY MASS INDEX: 28.51 KG/M2 | DIASTOLIC BLOOD PRESSURE: 77 MMHG | OXYGEN SATURATION: 96 % | SYSTOLIC BLOOD PRESSURE: 148 MMHG | HEIGHT: 61 IN | WEIGHT: 151 LBS | HEART RATE: 71 BPM

## 2024-09-25 DIAGNOSIS — G47.33 OSA (OBSTRUCTIVE SLEEP APNEA): Primary | ICD-10-CM

## 2024-09-25 DIAGNOSIS — I10 HYPERTENSION, UNSPECIFIED TYPE: ICD-10-CM

## 2024-09-25 PROCEDURE — 1160F RVW MEDS BY RX/DR IN RCRD: CPT | Performed by: STUDENT IN AN ORGANIZED HEALTH CARE EDUCATION/TRAINING PROGRAM

## 2024-09-25 PROCEDURE — G2211 COMPLEX E/M VISIT ADD ON: HCPCS | Performed by: STUDENT IN AN ORGANIZED HEALTH CARE EDUCATION/TRAINING PROGRAM

## 2024-09-25 PROCEDURE — 99214 OFFICE O/P EST MOD 30 MIN: CPT | Performed by: STUDENT IN AN ORGANIZED HEALTH CARE EDUCATION/TRAINING PROGRAM

## 2024-09-25 PROCEDURE — 3077F SYST BP >= 140 MM HG: CPT | Performed by: STUDENT IN AN ORGANIZED HEALTH CARE EDUCATION/TRAINING PROGRAM

## 2024-09-25 PROCEDURE — 1036F TOBACCO NON-USER: CPT | Performed by: STUDENT IN AN ORGANIZED HEALTH CARE EDUCATION/TRAINING PROGRAM

## 2024-09-25 PROCEDURE — 1159F MED LIST DOCD IN RCRD: CPT | Performed by: STUDENT IN AN ORGANIZED HEALTH CARE EDUCATION/TRAINING PROGRAM

## 2024-09-25 PROCEDURE — 3078F DIAST BP <80 MM HG: CPT | Performed by: STUDENT IN AN ORGANIZED HEALTH CARE EDUCATION/TRAINING PROGRAM

## 2024-09-25 ASSESSMENT — SLEEP AND FATIGUE QUESTIONNAIRES
HOW LIKELY ARE YOU TO NOD OFF OR FALL ASLEEP WHEN YOU ARE A PASSENGER IN A CAR FOR AN HOUR WITHOUT A BREAK: WOULD NEVER DOZE
SATISFACTION_WITH_CURRENT_SLEEP_PATTERN: SATISFIED
SLEEP_PROBLEM_NOTICEABLE_TO_OTHERS: SOMEWHAT
SITING INACTIVE IN A PUBLIC PLACE LIKE A CLASS ROOM OR A MOVIE THEATER: WOULD NEVER DOZE
WORRIED_DISTRESSED_DUE_TO_SLEEP: A LITTLE
ESS-CHAD TOTAL SCORE: 0
HOW LIKELY ARE YOU TO NOD OFF OR FALL ASLEEP WHILE LYING DOWN TO REST IN THE AFTERNOON WHEN CIRCUMSTANCES PERMIT: WOULD NEVER DOZE
DIFFICULTY_STAYING_ASLEEP: MILD
SLEEP_PROBLEM_INTERFERES_DAILY_ACTIVITIES: A LITTLE
HOW LIKELY ARE YOU TO NOD OFF OR FALL ASLEEP WHILE WATCHING TV: WOULD NEVER DOZE
HOW LIKELY ARE YOU TO NOD OFF OR FALL ASLEEP WHILE SITTING AND READING: WOULD NEVER DOZE
HOW LIKELY ARE YOU TO NOD OFF OR FALL ASLEEP IN A CAR, WHILE STOPPED FOR A FEW MINUTES IN TRAFFIC: WOULD NEVER DOZE
HOW LIKELY ARE YOU TO NOD OFF OR FALL ASLEEP WHILE SITTING AND TALKING TO SOMEONE: WOULD NEVER DOZE
DIFFICULTY_FALLING_ASLEEP: MODERATE
HOW LIKELY ARE YOU TO NOD OFF OR FALL ASLEEP WHILE SITTING QUIETLY AFTER LUNCH WITHOUT ALCOHOL: WOULD NEVER DOZE

## 2024-09-25 ASSESSMENT — ENCOUNTER SYMPTOMS
PSYCHIATRIC NEGATIVE: 1
CARDIOVASCULAR NEGATIVE: 1
NEUROLOGICAL NEGATIVE: 1
CONSTITUTIONAL NEGATIVE: 1
RESPIRATORY NEGATIVE: 1

## 2024-09-25 NOTE — PROGRESS NOTES
Patient: Moriah Quintanilla    [id number]  : 8/3/1931 -- AGE 89 y.o.    Provider: Js Rondon MD     Location [facility]   Service Date: 2024              [facility]  Followup Visit Note    ASSESSMENT/PLAN     Ms. Quintanilla is a 89 y.o. female and she returns in followup to the [facility] for the problems listed below.    Problem List, Orders, Assessment, Recommendations:  Problem List Items Addressed This Visit             ICD-10-CM    Hypertension I10     BP Readings from Last 1 Encounters:   24 148/77     - BP mildly elevated today but asymptomatic  - discussed at length the impact of untreated MODESTO and BP control  - continue current management and follow-up with PCP          MODESTO (obstructive sleep apnea) - Primary G47.33     Not using her CPAP as regularly as before.  She reports that she is pretty functional without the CPAP as well.  She sleeps about 8 hours a night and 2 hours nap during the day and she is ok with that.  She is to visit her son in [place] in Oct.  She is excited about it  I encouraged her to try to use her PAP therapy more as she does feel a bit better with it.  Renew supply         Relevant Orders    Positive Airway Pressure (PAP) Therapy       Disposition    Return to clinic in 12 months         HISTORY OF PRESENT ILLNESS     HISTORY OF PRESENT ILLNESS   Moriah Quintanilla is a 89 y.o. female with h/o Hypertension and MODESTO who presents to a [facility] for followup.     Assessment and plan from last visit: 7/10/2024    Ms. Quintanilla is a 89 y.o. female and she returns in followup to the [facility] for MODESTO.     Problem List, Orders, Assessment, Recommendations:  Problem List Items Addressed This Visit               ICD-10-CM     Hypertension - Primary I10           BP Readings from Last 1 Encounters:   07/10/24 133/67      - doing well, asymptomatic  - discussed at  length the impact of untreated MODESTO and BP control  - continue current management and follow-up with PCP            MODESTO (obstructive sleep apnea) G47.Jj       Has her machine. Did well initially and found it helpful, then she went to [place] for 4 months and forgot to bring it with her, then she just stopped using  Resumed about 2 nights ago and found it very help  Ready to start again           Relevant Orders     Positive Airway Pressure (PAP) Therapy         Disposition     Return to clinic in 4 months    Current History    On today's visit, the patient reports that she is not using her CPAP as regularly as before.  She reports that she is pretty functional without the CPAP as well.  She sleeps about 8 hours a night and 2 hours nap during the day and she is ok with that.  She is to visit her son in  in Oct.  She is excited about it  She does feel a bit better with it.    PAP Info  DURABLE MEDICAL EQUIPMENT COMPANY: [employer]  Issues with therapy: ISSUES WITH THERAPY: None  Benefits with PAP: PERCEIVED BENEFITS OF PAP: refreshing sleep    PAP Adherence  A PAP adherence download was obtained and data was reviewed personally today in clinic.    RLS Followup:   none.    Daytime Symptoms    Patient reports DAYTIME SYMPTOMS: no daytime symptoms  Patient denies daytime symptoms including: Denies: excessive daytime sleepiness feeling sleepy when driving    Naps: No  Fatigue: denies feeling fatigue    ESS: 0  CLAUDIA: 8  FOSQ: 40    REVIEW OF SYSTEMS     REVIEW OF SYSTEMS  Review of Systems   Constitutional: Negative.    HENT: Negative.     Respiratory: Negative.     Cardiovascular: Negative.    Genitourinary: Negative.    Skin: Negative.    Neurological: Negative.    Psychiatric/Behavioral: Negative.       ALLERGIES AND MEDICATIONS     ALLERGIES  No Known Allergies    MEDICATIONS: She has a current medication list which includes the following prescription(s): acetaminophen - Take 2 tablets (1,000  mg) by mouth 2 times a day for 7 days, amlodipine - Take 1 tablet (5 mg) by mouth once daily, cholecalciferol - Take 1 tablet (2,000 Units) by mouth, incontinence pad, liner, disp - 1 each 4 times a day as needed (incontnence), levothyroxine - Take 1 tablet (112 mcg) by mouth once daily, ensure - DRINK ONE CAN IN THE MORNING AND ONE IN THE EVENING, and rosuvastatin - Take 1 tablet (10 mg) by mouth once daily.    PAST MEDICAL HISTORY : She  has a past medical history of Acute upper respiratory infection, unspecified (07/25/2019), Age-related osteoporosis without current pathological fracture (02/25/2021), Encounter for examination of ears and hearing without abnormal findings (09/30/2019), Encounter for immunization, Encounter for screening mammogram for malignant neoplasm of breast (04/25/2017), Localized edema (04/11/2022), Localized edema (05/19/2022), Obstructive sleep apnea (adult) (pediatric) (01/13/2020), Olecranon bursitis, unspecified elbow (11/29/2020), Other specified abnormal findings of blood chemistry (12/30/2021), Pain in unspecified knee (10/18/2022), Personal history of other diseases of the nervous system and sense organs (07/28/2019), Personal history of other drug therapy (12/05/2018), Personal history of other drug therapy (10/13/2016), Personal history of other drug therapy (10/26/2018), Personal history of other endocrine, nutritional and metabolic disease (04/25/2013), Personal history of other infectious and parasitic diseases (04/12/2020), Personal history of other mental and behavioral disorders (04/25/2013), Personal history of other specified conditions (09/21/2016), Personal history of other specified conditions (04/02/2020), Personal history of other specified conditions (03/22/2021), Personal history of urinary (tract) infections (06/18/2018), Personal history of urinary (tract) infections (08/27/2021), Personal history of urinary (tract) infections (03/12/2013), Rash and other  "nonspecific skin eruption (10/13/2016), Strain of muscle, fascia and tendon at neck level, initial encounter (01/15/2019), Strain of right quadriceps muscle, fascia and tendon, initial encounter (10/17/2022), Unilateral post-traumatic osteoarthritis, left knee (10/31/2018), Unspecified fall, initial encounter (10/31/2022), Unspecified internal derangement of left knee (04/02/2020), and Zoster without complications (10/06/2015).    PAST SURGICAL HISTORY: She  has a past surgical history that includes Other surgical history (12/05/2018) and Ankle surgery (07/18/2016).     FAMILY HISTORY: No changes since previous visit. Otherwise non-contributory as charted.     SOCIAL HISTORY  She  reports that she has never smoked. She has never used smokeless tobacco. She reports current alcohol use of about 1.0 standard drink of alcohol per week. She reports that she does not use drugs.       PHYSICAL EXAM     VITAL SIGNS: /77   Pulse 71   Ht 1.549 m (5' 1\")   Wt 68.5 kg (151 lb)   SpO2 96%   BMI 28.53 kg/m²      PREVIOUS WEIGHTS:  Wt Readings from Last 3 Encounters:   09/25/24 68.5 kg (151 lb)   09/19/24 63.5 kg (140 lb)   07/10/24 68.9 kg (152 lb)         RESULTS/DATA     Bicarbonate (mmol/L)   Date Value   07/14/2023 26   10/31/2022 28   03/01/2021 27               "

## 2024-09-25 NOTE — ASSESSMENT & PLAN NOTE
BP Readings from Last 1 Encounters:   09/25/24 148/77     - BP mildly elevated today but asymptomatic  - discussed at length the impact of untreated MODESTO and BP control  - continue current management and follow-up with PCP

## 2024-09-25 NOTE — ASSESSMENT & PLAN NOTE
Not using her CPAP as regularly as before.  She reports that she is pretty functional without the CPAP as well.  She sleeps about 8 hours a night and 2 hours nap during the day and she is ok with that.  She is to visit her son in  in Oct.  She is excited about it  I encouraged her to try to use her PAP therapy more as she does feel a bit better with it.  Renew supply

## 2024-12-11 ENCOUNTER — LAB (OUTPATIENT)
Dept: LAB | Facility: LAB | Age: 89
End: 2024-12-11
Payer: MEDICARE

## 2024-12-11 ENCOUNTER — APPOINTMENT (OUTPATIENT)
Dept: PRIMARY CARE | Facility: CLINIC | Age: 89
End: 2024-12-11
Payer: MEDICARE

## 2024-12-11 VITALS
DIASTOLIC BLOOD PRESSURE: 84 MMHG | WEIGHT: 152 LBS | OXYGEN SATURATION: 95 % | HEART RATE: 69 BPM | HEIGHT: 61 IN | BODY MASS INDEX: 28.7 KG/M2 | SYSTOLIC BLOOD PRESSURE: 172 MMHG

## 2024-12-11 DIAGNOSIS — Z00.00 MEDICARE ANNUAL WELLNESS VISIT, SUBSEQUENT: Primary | ICD-10-CM

## 2024-12-11 DIAGNOSIS — E03.9 HYPOTHYROIDISM, UNSPECIFIED TYPE: ICD-10-CM

## 2024-12-11 DIAGNOSIS — I10 HYPERTENSION, UNSPECIFIED TYPE: ICD-10-CM

## 2024-12-11 DIAGNOSIS — E53.8 VITAMIN B12 DEFICIENCY: ICD-10-CM

## 2024-12-11 DIAGNOSIS — Z00.00 MEDICARE ANNUAL WELLNESS VISIT, SUBSEQUENT: ICD-10-CM

## 2024-12-11 DIAGNOSIS — Z12.31 ENCOUNTER FOR SCREENING MAMMOGRAM FOR MALIGNANT NEOPLASM OF BREAST: ICD-10-CM

## 2024-12-11 DIAGNOSIS — G47.33 OSA (OBSTRUCTIVE SLEEP APNEA): ICD-10-CM

## 2024-12-11 DIAGNOSIS — K59.00 CONSTIPATION, UNSPECIFIED CONSTIPATION TYPE: ICD-10-CM

## 2024-12-11 DIAGNOSIS — M85.89 OSTEOPENIA OF MULTIPLE SITES: ICD-10-CM

## 2024-12-11 DIAGNOSIS — E78.5 HYPERLIPIDEMIA, UNSPECIFIED HYPERLIPIDEMIA TYPE: ICD-10-CM

## 2024-12-11 LAB
ALBUMIN SERPL BCP-MCNC: 4.3 G/DL (ref 3.4–5)
ALP SERPL-CCNC: 67 U/L (ref 33–136)
ALT SERPL W P-5'-P-CCNC: 10 U/L (ref 7–45)
ANION GAP SERPL CALC-SCNC: 12 MMOL/L (ref 10–20)
AST SERPL W P-5'-P-CCNC: 17 U/L (ref 9–39)
BILIRUB SERPL-MCNC: 0.7 MG/DL (ref 0–1.2)
BUN SERPL-MCNC: 13 MG/DL (ref 6–23)
CALCIUM SERPL-MCNC: 10.2 MG/DL (ref 8.6–10.6)
CHLORIDE SERPL-SCNC: 104 MMOL/L (ref 98–107)
CHOLEST SERPL-MCNC: 174 MG/DL (ref 0–199)
CHOLESTEROL/HDL RATIO: 3.1
CO2 SERPL-SCNC: 31 MMOL/L (ref 21–32)
CREAT SERPL-MCNC: 0.67 MG/DL (ref 0.5–1.05)
EGFRCR SERPLBLD CKD-EPI 2021: 82 ML/MIN/1.73M*2
ERYTHROCYTE [DISTWIDTH] IN BLOOD BY AUTOMATED COUNT: 13.9 % (ref 11.5–14.5)
GLUCOSE SERPL-MCNC: 88 MG/DL (ref 74–99)
HCT VFR BLD AUTO: 39 % (ref 36–46)
HDLC SERPL-MCNC: 55.4 MG/DL
HGB BLD-MCNC: 12.1 G/DL (ref 12–16)
LDLC SERPL CALC-MCNC: 87 MG/DL
MCH RBC QN AUTO: 29.9 PG (ref 26–34)
MCHC RBC AUTO-ENTMCNC: 31 G/DL (ref 32–36)
MCV RBC AUTO: 96 FL (ref 80–100)
NON HDL CHOLESTEROL: 119 MG/DL (ref 0–149)
NRBC BLD-RTO: 0 /100 WBCS (ref 0–0)
PLATELET # BLD AUTO: 240 X10*3/UL (ref 150–450)
POTASSIUM SERPL-SCNC: 4.5 MMOL/L (ref 3.5–5.3)
PROT SERPL-MCNC: 7.4 G/DL (ref 6.4–8.2)
RBC # BLD AUTO: 4.05 X10*6/UL (ref 4–5.2)
SODIUM SERPL-SCNC: 142 MMOL/L (ref 136–145)
TRIGL SERPL-MCNC: 156 MG/DL (ref 0–149)
TSH SERPL-ACNC: 13.35 MIU/L (ref 0.44–3.98)
VIT B12 SERPL-MCNC: 195 PG/ML (ref 211–911)
VLDL: 31 MG/DL (ref 0–40)
WBC # BLD AUTO: 6.7 X10*3/UL (ref 4.4–11.3)

## 2024-12-11 PROCEDURE — 1159F MED LIST DOCD IN RCRD: CPT | Performed by: SPECIALIST

## 2024-12-11 PROCEDURE — 3077F SYST BP >= 140 MM HG: CPT | Performed by: SPECIALIST

## 2024-12-11 PROCEDURE — 85027 COMPLETE CBC AUTOMATED: CPT

## 2024-12-11 PROCEDURE — 36415 COLL VENOUS BLD VENIPUNCTURE: CPT

## 2024-12-11 PROCEDURE — 1170F FXNL STATUS ASSESSED: CPT | Performed by: SPECIALIST

## 2024-12-11 PROCEDURE — 3079F DIAST BP 80-89 MM HG: CPT | Performed by: SPECIALIST

## 2024-12-11 PROCEDURE — 80053 COMPREHEN METABOLIC PANEL: CPT

## 2024-12-11 PROCEDURE — 99397 PER PM REEVAL EST PAT 65+ YR: CPT | Performed by: SPECIALIST

## 2024-12-11 PROCEDURE — 1160F RVW MEDS BY RX/DR IN RCRD: CPT | Performed by: SPECIALIST

## 2024-12-11 PROCEDURE — 80061 LIPID PANEL: CPT

## 2024-12-11 PROCEDURE — 82607 VITAMIN B-12: CPT

## 2024-12-11 PROCEDURE — G0439 PPPS, SUBSEQ VISIT: HCPCS | Performed by: SPECIALIST

## 2024-12-11 PROCEDURE — 1126F AMNT PAIN NOTED NONE PRSNT: CPT | Performed by: SPECIALIST

## 2024-12-11 PROCEDURE — 84443 ASSAY THYROID STIM HORMONE: CPT

## 2024-12-11 RX ORDER — ROSUVASTATIN CALCIUM 10 MG/1
10 TABLET, COATED ORAL DAILY
Qty: 90 TABLET | Refills: 1 | Status: SHIPPED | OUTPATIENT
Start: 2024-12-11

## 2024-12-11 RX ORDER — AMLODIPINE BESYLATE 2.5 MG/1
2.5 TABLET ORAL DAILY
Qty: 90 TABLET | Refills: 1 | Status: SHIPPED | OUTPATIENT
Start: 2024-12-11 | End: 2025-06-09

## 2024-12-11 ASSESSMENT — ACTIVITIES OF DAILY LIVING (ADL)
TAKING_MEDICATION: INDEPENDENT
BATHING: INDEPENDENT
GROCERY_SHOPPING: INDEPENDENT
TAKING_MEDICATION: INDEPENDENT
DRESSING: INDEPENDENT
GROCERY_SHOPPING: INDEPENDENT
DOING_HOUSEWORK: INDEPENDENT
DOING_HOUSEWORK: INDEPENDENT
MANAGING_FINANCES: INDEPENDENT
MANAGING_FINANCES: INDEPENDENT

## 2024-12-11 ASSESSMENT — ENCOUNTER SYMPTOMS
LOSS OF SENSATION IN FEET: 0
DEPRESSION: 0
OCCASIONAL FEELINGS OF UNSTEADINESS: 0

## 2024-12-11 ASSESSMENT — PAIN SCALES - GENERAL: PAINLEVEL_OUTOF10: 0-NO PAIN

## 2024-12-11 ASSESSMENT — PATIENT HEALTH QUESTIONNAIRE - PHQ9
2. FEELING DOWN, DEPRESSED OR HOPELESS: NOT AT ALL
SUM OF ALL RESPONSES TO PHQ9 QUESTIONS 1 AND 2: 0
1. LITTLE INTEREST OR PLEASURE IN DOING THINGS: NOT AT ALL

## 2024-12-11 NOTE — PROGRESS NOTES
Subjective   Patient ID: Moriah Quintanilla is a 89 y.o. female who presents for Annual Exam.  HPI    89 yo female Pmhx sig for Hypertension, TIA 1999, Hypothyroidism, MODESTO (Not using CPAP regularly has nasal pillows), B12 Deficiency, Falls and OA Knee presents for annual wellness     Here with Granddaughter  Was visiting granddaughter in [place]     Patient's granddaughter said her mother wants a prescription for Linzess for her grandmother since her mother takes it and it works for her.  Discussed.  Used to take metamucil.  Discussed staying hydrated, fiber in diet, and resuming metamucil.  If no improvement, consider daily miraLax    Still living by herself, granddaughter is staying with her for a while  Ambulates with cane or rollator    Not taking Ensure     Has   Still managing her own finances but plans to have family review living expenses soon    Has Living Will and health care POA  Son, Kenyon Quintanilla Jr is POA    Goals of Care:  Treat treatable conditions, but would not want to prolong the act of dying through extraordinary means if no hope for meaningful recovery.  Said son is aware    No Known Allergies   Current Outpatient Medications   Medication Instructions    amLODIPine (Norvasc) 5 mg tablet 1 tablet, oral, Daily    cholecalciferol (VITAMIN D-3) 2,000 Units, oral    incontinence pad, liner, disp pad 1 each, miscellaneous, 4 times daily PRN    levothyroxine (SYNTHROID, LEVOXYL) 112 mcg, oral, Daily    nutritional drink (Ensure) liquid DRINK ONE CAN IN THE MORNING AND ONE IN THE EVENING    rosuvastatin (CRESTOR) 10 mg, oral, Daily        Review of Systems  Constitutional  No fatigue, no fevers, no chills, no unintentional weight loss,   HEENT:  No headaches, no dizziness, eye exams current, no double vision, no blurred vision, using hearing aids for hearing loss  Cardiovascular:  No chest pain, no palpitations, no shortness of breath with exertion (one flight of stairs),   Respiratory:  No  "cough, no hemoptysis, no wheezing, No shortness of breath at rest  GI:  No dysphagia, no odynophagia, no reflux, no abdominal pain, no nausea, no vomiting, Positive constipation but had bm yesterday, no bright red blood per rectum, no melena  :  No urinary frequency, no dysuria, no urine incontinence  MSK:  No falls, no joint pain, no joint swelling  Neuro:  No tremors, no extremity weakness, no changes in sensation    Physical Exam  /84   Pulse 69   Ht 1.549 m (5' 1\")   Wt 68.9 kg (152 lb)   SpO2 95%   BMI 28.72 kg/m²   General:    Well-appearing  F in no acute distress, well nourished, well hydrated  Head:  Normocephalic, atraumatic  Skin:          Warm dry,   Eyes:  Anicteric sclera, pupils equal,   Ears:        TMs intact  Oral:      Not examined due to pandemic  Neck:   Supple, no cervical/supraclavicular adenopathy, no thyromegaly or nodules appreciated on exam  Cor:      Regular rate, normal S1, S2, no murmurs appreciated, no S3, no S4   Lungs:   Clear to auscultation b/l, no wheezes, no rhonchi, no crackles, no accessory respiratory muscle use  Abd:          Soft, nontender, no guarding, no rebound, no hepatosplenomegaly appreciated depends in place  Ext:            No lower extremity edema, no palpable cords  Pulses:      Posterior Tibial Pedal pulses intact  Neuro:   CN 2-12 grossly intact (except funduscopic exam not performed and visual fields not examined)  Breasts:     Declined    Assessment/Plan   Problem List Items Addressed This Visit    None  MAW  Previously received annual influenza vaccien  Previously received Covid vaccine in October  Prior Tdap 3/30/2020  Recommend Shingrix vaccines  Prior Prevnar 13 in 2018 recommend Prevnar 20 or 21  Recommend RSV vaccine   Mammo 4/11/2024 repeat 1 yr  DXA 4/8/2024 Osteopenia Frax Major 5.7, hip 1.7, repeat 2 yrs  Labs ordered CMP CBC TSH Fx Lipid B12    Breast Cancer Screening  Mammogram ordered 4/11/2024, ordered    B12 Deficiency  B12 333 two " yrs ago  Labs ordered B12    Htn  Said she is not taking 5 mg amlodipine daily since July?  Restart amlodipine 2.5 mg daily. ordered  Recommend home BP monitoring  Discussed correct home BP measurement technique  To let me know if home BP above 140/90 so we may up-titrate amlodipine    Hyperlipidemia  Refilled rosuvastatin  Labs ordered FX Lipids CMP    Hypothyroidism  Labs ordered TSH  Currently taking 125 mcgs levothyroxine    Osteopenia  DXA 4/8/2024 osteopenia, Frax Major 5.7, Hip 1.7  Repeat 2 yrs  Recommend regular weight bearing exercise as tolerated  Daily Calcium intake of 1200 mg (ideally through diet or diet and supplement)  Continue Vitamin D3 2000 units daily    MODESTO  Is not using CPAP regularly  Management per sleep medicine     Constipation  Recommend Metamucil (used to take but stopped taking it) and staying hydrated   Consider MiraLAX if no improvement  Check TSH       Susu Sierra, DO

## 2024-12-11 NOTE — PATIENT INSTRUCTIONS
Recommend Prevnar 21 or 20 (pneumonia vaccine)  Recommend RSV vaccine  Recommend Shingrix vaccines

## 2024-12-13 ENCOUNTER — DOCUMENTATION (OUTPATIENT)
Dept: PRIMARY CARE | Facility: CLINIC | Age: 89
End: 2024-12-13
Payer: MEDICARE

## 2024-12-13 ENCOUNTER — TELEPHONE (OUTPATIENT)
Dept: PRIMARY CARE | Facility: CLINIC | Age: 89
End: 2024-12-13
Payer: MEDICARE

## 2024-12-13 DIAGNOSIS — E03.9 HYPOTHYROIDISM, UNSPECIFIED TYPE: Primary | ICD-10-CM

## 2024-12-13 DIAGNOSIS — E03.9 HYPOTHYROIDISM, UNSPECIFIED TYPE: ICD-10-CM

## 2024-12-13 DIAGNOSIS — E53.8 VITAMIN B12 DEFICIENCY: ICD-10-CM

## 2024-12-13 RX ORDER — LEVOTHYROXINE SODIUM 125 UG/1
125 TABLET ORAL DAILY
Qty: 90 TABLET | Refills: 0 | Status: SHIPPED | OUTPATIENT
Start: 2024-12-13 | End: 2024-12-16 | Stop reason: SDUPTHER

## 2024-12-17 RX ORDER — LEVOTHYROXINE SODIUM 125 UG/1
125 TABLET ORAL DAILY
Qty: 90 TABLET | Refills: 0 | Status: SHIPPED | OUTPATIENT
Start: 2024-12-17 | End: 2024-12-19 | Stop reason: SDUPTHER

## 2024-12-19 ENCOUNTER — TELEPHONE (OUTPATIENT)
Dept: PRIMARY CARE | Facility: CLINIC | Age: 89
End: 2024-12-19

## 2024-12-19 RX ORDER — LEVOTHYROXINE SODIUM 125 UG/1
125 TABLET ORAL DAILY
Qty: 90 TABLET | Refills: 0 | Status: SHIPPED | OUTPATIENT
Start: 2024-12-19 | End: 2025-12-19

## 2024-12-26 PROBLEM — K59.00 CONSTIPATION: Status: ACTIVE | Noted: 2024-12-26

## 2024-12-26 PROBLEM — Z12.31 ENCOUNTER FOR SCREENING MAMMOGRAM FOR MALIGNANT NEOPLASM OF BREAST: Status: ACTIVE | Noted: 2024-12-26

## 2025-02-21 ENCOUNTER — TELEPHONE (OUTPATIENT)
Dept: PRIMARY CARE | Facility: CLINIC | Age: OVER 89
End: 2025-02-21
Payer: MEDICARE

## 2025-02-21 DIAGNOSIS — G89.29 CHRONIC PAIN OF LEFT KNEE: Primary | ICD-10-CM

## 2025-02-21 DIAGNOSIS — M25.562 CHRONIC PAIN OF LEFT KNEE: Primary | ICD-10-CM

## 2025-02-21 NOTE — TELEPHONE ENCOUNTER
Pt called in need a referral for injection in left knee. She stated seen a doctor maybe about 3 yrs ago and can't remember the doctor name. Good call back number [phone]

## 2025-03-02 RX ORDER — LEVOTHYROXINE SODIUM 112 UG/1
112 TABLET ORAL DAILY
Qty: 90 TABLET | OUTPATIENT
Start: 2025-03-02

## 2025-03-04 ENCOUNTER — HOSPITAL ENCOUNTER (OUTPATIENT)
Dept: RADIOLOGY | Facility: HOSPITAL | Age: OVER 89
Discharge: HOME | End: 2025-03-04
Payer: MEDICARE

## 2025-03-04 ENCOUNTER — OFFICE VISIT (OUTPATIENT)
Dept: ORTHOPEDIC SURGERY | Facility: HOSPITAL | Age: OVER 89
End: 2025-03-04
Payer: MEDICARE

## 2025-03-04 VITALS — WEIGHT: 146 LBS | BODY MASS INDEX: 27.56 KG/M2 | HEIGHT: 61 IN

## 2025-03-04 DIAGNOSIS — M25.571 ACUTE RIGHT ANKLE PAIN: ICD-10-CM

## 2025-03-04 DIAGNOSIS — M76.822 LEFT TIBIALIS POSTERIOR TENDINITIS: Primary | ICD-10-CM

## 2025-03-04 PROCEDURE — 73610 X-RAY EXAM OF ANKLE: CPT | Mod: LEFT SIDE | Performed by: RADIOLOGY

## 2025-03-04 PROCEDURE — 1159F MED LIST DOCD IN RCRD: CPT | Performed by: SPECIALIST/TECHNOLOGIST

## 2025-03-04 PROCEDURE — 1160F RVW MEDS BY RX/DR IN RCRD: CPT | Performed by: SPECIALIST/TECHNOLOGIST

## 2025-03-04 PROCEDURE — 99213 OFFICE O/P EST LOW 20 MIN: CPT | Performed by: SPECIALIST/TECHNOLOGIST

## 2025-03-04 PROCEDURE — 73610 X-RAY EXAM OF ANKLE: CPT | Mod: LT

## 2025-03-04 PROCEDURE — 1036F TOBACCO NON-USER: CPT | Performed by: SPECIALIST/TECHNOLOGIST

## 2025-03-04 PROCEDURE — L1902 AFO ANKLE GAUNTLET PRE OTS: HCPCS | Performed by: SPECIALIST/TECHNOLOGIST

## 2025-03-04 ASSESSMENT — PAIN - FUNCTIONAL ASSESSMENT: PAIN_FUNCTIONAL_ASSESSMENT: 0-10

## 2025-03-04 NOTE — PROGRESS NOTES
Chief Complaint: Pain of the Left Ankle       HPI:  Moriah Quintanilla is a 89 y.o. female presenting, with her granddaughter, for 6 weeks of left ankle pain stemming from a fall down stairs.  She states she was walking from her house to her garage and missed the stairs, she fell down 2 steps.  Today she reports a sharp sensation over the medial aspect of her left ankle that is worsened with palpation.  She uses a walker and a cane while at home and presents today in a wheelchair.  Her and her granddaughter workout daily and have not altered their workout regimen to this point.  She has been taking Tylenol since occasionally and feels that this is not really providing her much symptomatic relief.  She does have a prior history of an ORIF in 1980 of the medial malleolus and the screw and her ankle had been giving her 0 issues up until approximately 6 weeks ago.  She denies numbness or tingling into the left lower extremity.  She presents for treatment recommendations.    Objective     ROS:  Constitutional: No fever, no chills, not feeling tired, no recent weight gain and no recent weight loss  ENT: No nosebleeds  Cardiovascular: No chest pain  Respiratory: No shortness of breath and no cough  Gastrointestinal: No abdominal pain, no nausea, no diarrhea, and no vomiting  Musculoskeletal: Positive for left ankle pain  Integumentary: No rashes and no skin lesions  Neurological: No headache  Psychiatric: No sleep disturbances no depression  Endocrine: No muscle weakness and no muscle cramps  Hematologic/lymphatic: No swelling glands and no tendency for easy bruising    Past Medical History:   Diagnosis Date    Acute upper respiratory infection, unspecified 07/25/2019    Viral upper respiratory tract infection    Age-related osteoporosis without current pathological fracture 02/25/2021    Age related osteoporosis    Encounter for examination of ears and hearing without abnormal findings 09/30/2019    Encounter for audiology  evaluation    Encounter for immunization     Encounter for immunization    Encounter for screening mammogram for malignant neoplasm of breast 04/25/2017    Screening mammogram for high-risk patient    Localized edema 04/11/2022    Unilateral edema of lower extremity    Localized edema 05/19/2022    Leg edema    Obstructive sleep apnea (adult) (pediatric) 01/13/2020    MODESTO on CPAP    Olecranon bursitis, unspecified elbow 11/29/2020    Olecranon bursitis    Other specified abnormal findings of blood chemistry 12/30/2021    Abnormal thyroid blood test    Pain in unspecified knee 10/18/2022    Knee pain    Personal history of other diseases of the nervous system and sense organs 07/28/2019    History of conjunctivitis    Personal history of other drug therapy 12/05/2018    History of pneumococcal vaccination    Personal history of other drug therapy 10/13/2016    History of influenza vaccination    Personal history of other drug therapy 10/26/2018    History of influenza vaccination    Personal history of other endocrine, nutritional and metabolic disease 04/25/2013    History of hypothyroidism    Personal history of other infectious and parasitic diseases 04/12/2020    History of Clostridioides difficile colitis    Personal history of other mental and behavioral disorders 04/25/2013    History of depression    Personal history of other specified conditions 09/21/2016    History of urinary urgency    Personal history of other specified conditions 04/02/2020    History of paresthesia    Personal history of other specified conditions 03/22/2021    History of palpitations    Personal history of urinary (tract) infections 06/18/2018    History of urinary tract infection    Personal history of urinary (tract) infections 08/27/2021    History of urinary tract infection    Personal history of urinary (tract) infections 03/12/2013    History of urinary tract infection    Rash and other nonspecific skin eruption 10/13/2016     Rash    Strain of muscle, fascia and tendon at neck level, initial encounter 01/15/2019    Neck strain    Strain of right quadriceps muscle, fascia and tendon, initial encounter 10/17/2022    Strain of quadriceps, right, initial encounter    Unilateral post-traumatic osteoarthritis, left knee 10/31/2018    Post-traumatic osteoarthritis of left knee    Unspecified fall, initial encounter 10/31/2022    Fall, accidental    Unspecified internal derangement of left knee 04/02/2020    Internal derangement of left knee    Zoster without complications 10/06/2015    Herpes zoster without complication        Past Surgical History:   Procedure Laterality Date    ANKLE SURGERY  07/18/2016    Ankle Surgery    OTHER SURGICAL HISTORY  12/05/2018    Tonsillectomy        Social Connections: Not on file          Physical Exam:  General appearance: WN, WD female, in no acute distress  Skin: No rashes, lesions or wounds  Head: Normocephalic, no evidence of trauma  Eye: EOMI, conjunctiva clear, no discharge  ENT: Nares patent  Neck: No abnormal contour, tracheal midline  Chest/lungs: No respiratory distress, speaking in complete sentences  Musculoskeletal: Tender to palpation over the medial malleolus, the screw head, posterior medial tibia.  Stable talar tilt inversion.  Stable talar tilt eversion.  Stable anterior drawer.  Negative Klieger.  Negative compression squeeze test.  5/5 manual muscle testing ankle dorsiflexion and eversion bilaterally.  4+/5 manual muscle testing ankle plantarflexion and inversion secondary to slight discomfort.  2+ dorsalis pedis pulses bilaterally no decreased ROM, muscle wasting, rigidity    Neurological: A&O x3, no focal deficits, intact bilateral LE  Psych: normal affect, mood, appearance      Image Results:  X-rays of the left ankle taken on 3/4/25 were reviewed with the patient and her granddaughter in the office today and show no acute fractures or dislocations.  There is evidence of a screw through  the medial malleolus that appears to be without fracture, loosening.  The screw head appears prominent on radiographs.      Assessment/Plan   Encounter Diagnoses:  Acute right ankle pain    Left tibialis posterior tendinitis    Orders Placed This Encounter    Ankle Brace, Lace Up or A60    Referral to Physical Therapy       The patient, her granddaughter and I discussed her clinical presentation and physical exam findings consistent with left ankle posterior tibialis tendinitis and left ankle contusion.  We agreed upon conservative treatment.  We agreed upon obtaining Voltaren gel and placing it 4 times daily over the medial aspect of her ankle and lower leg.  We agreed upon a lace up ankle brace to be worn for some additional support.  She has continued with her workout regimen and I encouraged her to allow the lower extremities some rest and adequate time to recover.  It is okay for her to do seated upper body exercises along as is not causing her difficulties.  We agreed upon a referral to physical therapy.  They will focus on soft tissue modalities, pain control, lower extremity flexibility and strengthening with a home exercise regimen.  She will follow-up in 8 weeks with Dr. Dubois if her symptoms persist or worsen.  She is in agreement the plan.  Her questions are answered.    Patient was prescribed a lace up ankle brace for left ankle posterior tibialis tendinitis.The patient is ambulatory with or without aid; but, has weakness, instability and/or deformity of their [left ankle which requires stabilization from this orthosis to improve their function.      Verbal and written instructions for the use, wear schedule, cleaning and application of this item were given.  Patient was instructed that should the brace result in increased pain, decreased sensation, increased swelling, or an overall worsening of their medical condition, to please contact our office immediately.     Orthotic management and training was  provided for skin care, modifications due to healing tissues, edema changes, interruption in skin integrity, and safety precautions with the orthosis.      ** This office note was dictated using Dragon voice to text software and was not proofread for spelling or grammatical errors **

## 2025-03-05 ENCOUNTER — APPOINTMENT (OUTPATIENT)
Dept: ORTHOPEDIC SURGERY | Facility: CLINIC | Age: OVER 89
End: 2025-03-05
Payer: MEDICARE

## 2025-04-30 ENCOUNTER — OFFICE VISIT (OUTPATIENT)
Dept: ORTHOPEDIC SURGERY | Facility: CLINIC | Age: OVER 89
End: 2025-04-30
Payer: MEDICARE

## 2025-04-30 ENCOUNTER — HOSPITAL ENCOUNTER (OUTPATIENT)
Dept: RADIOLOGY | Facility: CLINIC | Age: OVER 89
Discharge: HOME | End: 2025-04-30
Payer: MEDICARE

## 2025-04-30 DIAGNOSIS — M25.561 CHRONIC PAIN OF RIGHT KNEE: ICD-10-CM

## 2025-04-30 DIAGNOSIS — M17.11 PRIMARY OSTEOARTHRITIS OF RIGHT KNEE: Primary | ICD-10-CM

## 2025-04-30 DIAGNOSIS — G89.29 CHRONIC PAIN OF RIGHT KNEE: ICD-10-CM

## 2025-04-30 PROCEDURE — 1125F AMNT PAIN NOTED PAIN PRSNT: CPT | Performed by: STUDENT IN AN ORGANIZED HEALTH CARE EDUCATION/TRAINING PROGRAM

## 2025-04-30 PROCEDURE — 99214 OFFICE O/P EST MOD 30 MIN: CPT | Performed by: STUDENT IN AN ORGANIZED HEALTH CARE EDUCATION/TRAINING PROGRAM

## 2025-04-30 PROCEDURE — 73562 X-RAY EXAM OF KNEE 3: CPT | Mod: RIGHT SIDE | Performed by: RADIOLOGY

## 2025-04-30 PROCEDURE — 2500000004 HC RX 250 GENERAL PHARMACY W/ HCPCS (ALT 636 FOR OP/ED): Performed by: STUDENT IN AN ORGANIZED HEALTH CARE EDUCATION/TRAINING PROGRAM

## 2025-04-30 PROCEDURE — 20610 DRAIN/INJ JOINT/BURSA W/O US: CPT | Mod: RT | Performed by: STUDENT IN AN ORGANIZED HEALTH CARE EDUCATION/TRAINING PROGRAM

## 2025-04-30 PROCEDURE — 73562 X-RAY EXAM OF KNEE 3: CPT | Mod: RT

## 2025-04-30 PROCEDURE — 1159F MED LIST DOCD IN RCRD: CPT | Performed by: STUDENT IN AN ORGANIZED HEALTH CARE EDUCATION/TRAINING PROGRAM

## 2025-04-30 PROCEDURE — 20610 DRAIN/INJ JOINT/BURSA W/O US: CPT | Performed by: STUDENT IN AN ORGANIZED HEALTH CARE EDUCATION/TRAINING PROGRAM

## 2025-04-30 PROCEDURE — 99214 OFFICE O/P EST MOD 30 MIN: CPT | Mod: 25 | Performed by: STUDENT IN AN ORGANIZED HEALTH CARE EDUCATION/TRAINING PROGRAM

## 2025-04-30 RX ORDER — TRIAMCINOLONE ACETONIDE 40 MG/ML
40 INJECTION, SUSPENSION INTRA-ARTICULAR; INTRAMUSCULAR
Status: COMPLETED | OUTPATIENT
Start: 2025-04-30 | End: 2025-04-30

## 2025-04-30 RX ORDER — LIDOCAINE HYDROCHLORIDE 10 MG/ML
4 INJECTION, SOLUTION INFILTRATION; PERINEURAL
Status: COMPLETED | OUTPATIENT
Start: 2025-04-30 | End: 2025-04-30

## 2025-04-30 RX ADMIN — LIDOCAINE HYDROCHLORIDE 4 ML: 10 INJECTION, SOLUTION INFILTRATION; PERINEURAL at 22:20

## 2025-04-30 RX ADMIN — TRIAMCINOLONE ACETONIDE 40 MG: 200 INJECTION, SUSPENSION INTRA-ARTICULAR; INTRAMUSCULAR at 22:20

## 2025-04-30 ASSESSMENT — PAIN DESCRIPTION - DESCRIPTORS: DESCRIPTORS: ACHING

## 2025-04-30 ASSESSMENT — PAIN SCALES - GENERAL: PAINLEVEL_OUTOF10: 3

## 2025-04-30 ASSESSMENT — PAIN - FUNCTIONAL ASSESSMENT: PAIN_FUNCTIONAL_ASSESSMENT: 0-10

## 2025-05-01 NOTE — PROGRESS NOTES
ORTHOPAEDIC SURGERY OUTPATIENT PROGRESS NOTE    Chief Complaint:  Right knee pain    History Of Present Illness  Moriah Quintanilla is a 89 y.o. female who presents for evaluation of right knee pain.  Patient was initially referred for ankle pain but referred to discuss her knee.  Typical pain is reported as 3 out of 10.  Pain is made worse with walking as well as standing getting up from a seated position.  Patient has had injections in the past with the most recent one being over 2 years ago.  Patient reports of most recent injection was not particularly helpful.  She ambulates with the use of a cane or walker.     Past Medical History  Medical History[1]    Surgical History  Recent Surgeries in Orthopaedic Surgery            No cases to display             Social History  Social History[2]    Family History  Family History[3]     Allergies  RX Allergies[4]    Review of Systems  REVIEW OF SYSTEMS  Constitutional: no unplanned weight loss  Psychiatric: no suicidal ideation  ENT: no vision changes, no sinus problems  Pulmonary: no shortness of breath  Lymphatic: no enlarged lymph nodes  Cardiovascular: no chest pain or shortness of breath  Gastrointestinal: no stomach problems  Genitourinary: no dysuria   Skin: no rashes  Endocrine: no thyroid problems  Neurological: no headache, no numbness  Hematological: no easy bruising  Musculoskeletal: Right knee pain     Physical Exam  PHYSICAL EXAMINATION  Constitutional Exam: well developed and well nourished  Psychiatric Exam: alert and oriented, appropriate mood and behavior  Eye Exam: EOMI  Pulmonary Exam: breathing non-labored, no apparent distress  Lymphatic exam: no appreciable lymphadenopathy in the lower extremities  Cardiovascular exam: RRR to peripheral palpation, DP pulses 2+, PT 2+, toes are pink with good capillary refill, no pitting edema  Skin exam: no open lesions, rashes, abrasions or ulcerations  Neurological exam: sensation to light touch intact in both lower  extremities in peripheral and dermatomal distributions (except for any abnormalities noted in musculoskeletal exam)    Musculoskeletal exam: Right lower extremity examination.  Patient pain localized to the right knee diffusely.  She has tenderness to palpation at the medial and lateral patella facet as well as the medial joint line.  There is no obvious knee effusion.  Patient has painful knee range of motion from approximately full extension to 110 degrees of flexion.  Ligamentous examination stable.  Patient has sensation gross intact light touch in the distal extremity with intact plantarflexion, knee extension, ankle dorsiflexion and     Last Recorded Vitals  not currently breastfeeding.    Laboratory Results  No results found for this or any previous visit (from the past 24 hours).     Radiology Results  X-ray imaging 3 view weightbearing right knee reviewed and independently evaluated by me demonstrates tricompartmental osteoarthritis with joint space loss as well as osteophyte formation.    Patient ID: Moriah Quintanilla is a 89 y.o. female.    L Inj/Asp on 4/30/2025 10:20 PM  Medications: 4 mL lidocaine 10 mg/mL (1 %); 40 mg triamcinolone acetonide 40 mg/mL    I reviewed the risks and benefits of right knee CSI injection with the patient.  Risks including pain, bleeding, infection, hypopigmentation of the skin, loss of subcutaneous fat, metabolic complications and possibility that further injections may not be effective.  The patient gave informed consent for the procedure.       A time-out was called and confirmed identifying the right knee as the site of injection.  Sterile skin preparation was made over the right knee just lateral to the palpable patella tendon and the soft spot.  Then, using sterile technique, the right knee was injected with 4 cc of 1% lidocaine and 1cc of kenalog 40 mg with no complications.   The patient was given post-procedure instructions and precautions.  I personally performed the  procedure.              Assessment/Plan:  89-year-old female who my impression has right knee pain secondary to osteoarthritis.  I have reviewed the diagnosis and treatment options extensively with the patient.  I would recommend the patient continue weightbearing to her tolerance of the right lower extremity.  We discussed the diagnostic and therapeutic benefits of a right knee corticosteroid injection as well as use of oral and topical anti-inflammatory cream such as diclofenac/Voltaren.  Please see my separate procedure note, patient reported pain relief with corticosteroid injection.  I will plan to see the patient back in 3 months for consideration of repeat injection.  Upon return patient would not require further imaging.    Lobo Dubois MD, MATT  Department of Orthopaedic Surgery  [facility]    The diagnosis and treatment plan were reviewed with the patient. All questions were answered. The patient verbalized understanding of the treatment plan. There were no barriers to understanding identified.    Note dictated with [employer] software.  Completed without full type editing and sent to avoid delay.       [1]   Past Medical History:  Diagnosis Date    Acute upper respiratory infection, unspecified 07/25/2019    Viral upper respiratory tract infection    Age-related osteoporosis without current pathological fracture 02/25/2021    Age related osteoporosis    Encounter for examination of ears and hearing without abnormal findings 09/30/2019    Encounter for audiology evaluation    Encounter for immunization     Encounter for immunization    Encounter for screening mammogram for malignant neoplasm of breast 04/25/2017    Screening mammogram for high-risk patient    Localized edema 04/11/2022    Unilateral edema of lower extremity    Localized edema 05/19/2022    Leg edema    Obstructive sleep apnea (adult) (pediatric) 01/13/2020    MODESTO on CPAP     Olecranon bursitis, unspecified elbow 11/29/2020    Olecranon bursitis    Other specified abnormal findings of blood chemistry 12/30/2021    Abnormal thyroid blood test    Pain in unspecified knee 10/18/2022    Knee pain    Personal history of other diseases of the nervous system and sense organs 07/28/2019    History of conjunctivitis    Personal history of other drug therapy 12/05/2018    History of pneumococcal vaccination    Personal history of other drug therapy 10/13/2016    History of influenza vaccination    Personal history of other drug therapy 10/26/2018    History of influenza vaccination    Personal history of other endocrine, nutritional and metabolic disease 04/25/2013    History of hypothyroidism    Personal history of other infectious and parasitic diseases 04/12/2020    History of Clostridioides difficile colitis    Personal history of other mental and behavioral disorders 04/25/2013    History of depression    Personal history of other specified conditions 09/21/2016    History of urinary urgency    Personal history of other specified conditions 04/02/2020    History of paresthesia    Personal history of other specified conditions 03/22/2021    History of palpitations    Personal history of urinary (tract) infections 06/18/2018    History of urinary tract infection    Personal history of urinary (tract) infections 08/27/2021    History of urinary tract infection    Personal history of urinary (tract) infections 03/12/2013    History of urinary tract infection    Rash and other nonspecific skin eruption 10/13/2016    Rash    Strain of muscle, fascia and tendon at neck level, initial encounter 01/15/2019    Neck strain    Strain of right quadriceps muscle, fascia and tendon, initial encounter 10/17/2022    Strain of quadriceps, right, initial encounter    Unilateral post-traumatic osteoarthritis, left knee 10/31/2018    Post-traumatic osteoarthritis of left knee    Unspecified fall, initial  encounter 10/31/2022    Fall, accidental    Unspecified internal derangement of left knee 04/02/2020    Internal derangement of left knee    Zoster without complications 10/06/2015    Herpes zoster without complication   [2]   Social History  Socioeconomic History    Marital status:    Tobacco Use    Smoking status: Never    Smokeless tobacco: Never   Vaping Use    Vaping status: Never Used   Substance and Sexual Activity    Alcohol use: Yes     Alcohol/week: 1.0 standard drink of alcohol     Types: 1 Glasses of wine per week     Comment: rare small glass of blackberry Manischevits    Drug use: Never   [3]   Family History  Problem Relation Name Age of Onset    Diabetes Mother      Coronary artery disease Father      Diabetes Brother     [4] No Known Allergies

## 2025-06-09 ENCOUNTER — TELEPHONE (OUTPATIENT)
Dept: PRIMARY CARE | Facility: CLINIC | Age: OVER 89
End: 2025-06-09

## 2025-06-09 ENCOUNTER — TELEPHONE (OUTPATIENT)
Dept: ORTHOPEDIC SURGERY | Facility: CLINIC | Age: OVER 89
End: 2025-06-09
Payer: MEDICARE

## 2025-06-09 DIAGNOSIS — M54.2 NECK PAIN: Primary | ICD-10-CM

## 2025-06-09 NOTE — TELEPHONE ENCOUNTER
"Pt scheduled via [employer] for 8:00am 6- with cc of \"neck pain\", on Dr Dubois schedule.     I left a VM for pt's daughter to c/b to central scheduling for a spine appt.  sent also.    Appt cancelled.   "

## 2025-06-10 ENCOUNTER — APPOINTMENT (OUTPATIENT)
Dept: ORTHOPEDIC SURGERY | Facility: CLINIC | Age: OVER 89
End: 2025-06-10
Payer: MEDICARE

## 2025-06-12 DIAGNOSIS — M54.2 NECK PAIN: ICD-10-CM

## 2025-06-16 ENCOUNTER — APPOINTMENT (OUTPATIENT)
Dept: ORTHOPEDIC SURGERY | Facility: CLINIC | Age: OVER 89
End: 2025-06-16
Payer: MEDICARE

## 2025-06-17 DIAGNOSIS — E53.8 VITAMIN B12 DEFICIENCY: ICD-10-CM

## 2025-06-17 DIAGNOSIS — E78.5 HYPERLIPIDEMIA, UNSPECIFIED HYPERLIPIDEMIA TYPE: ICD-10-CM

## 2025-06-17 DIAGNOSIS — E03.9 HYPOTHYROIDISM, UNSPECIFIED TYPE: ICD-10-CM

## 2025-06-18 RX ORDER — AMLODIPINE BESYLATE 2.5 MG/1
2.5 TABLET ORAL DAILY
Qty: 90 TABLET | Refills: 0 | Status: SHIPPED | OUTPATIENT
Start: 2025-06-18

## 2025-06-18 RX ORDER — LEVOTHYROXINE SODIUM 125 UG/1
125 TABLET ORAL DAILY
Qty: 90 TABLET | Refills: 0 | Status: SHIPPED | OUTPATIENT
Start: 2025-06-18

## 2025-06-18 RX ORDER — ROSUVASTATIN CALCIUM 10 MG/1
10 TABLET, COATED ORAL DAILY
Qty: 90 TABLET | Refills: 0 | Status: SHIPPED | OUTPATIENT
Start: 2025-06-18

## 2025-06-30 ENCOUNTER — HOSPITAL ENCOUNTER (OUTPATIENT)
Dept: RADIOLOGY | Facility: CLINIC | Age: OVER 89
Discharge: HOME | End: 2025-06-30
Payer: MEDICARE

## 2025-06-30 ENCOUNTER — APPOINTMENT (OUTPATIENT)
Dept: ORTHOPEDIC SURGERY | Facility: CLINIC | Age: OVER 89
End: 2025-06-30
Payer: MEDICARE

## 2025-06-30 DIAGNOSIS — M54.2 NECK PAIN: ICD-10-CM

## 2025-06-30 DIAGNOSIS — M47.812 CERVICAL SPONDYLOSIS: ICD-10-CM

## 2025-06-30 PROCEDURE — 72050 X-RAY EXAM NECK SPINE 4/5VWS: CPT

## 2025-06-30 PROCEDURE — 99213 OFFICE O/P EST LOW 20 MIN: CPT | Performed by: STUDENT IN AN ORGANIZED HEALTH CARE EDUCATION/TRAINING PROGRAM

## 2025-06-30 PROCEDURE — 72050 X-RAY EXAM NECK SPINE 4/5VWS: CPT | Performed by: STUDENT IN AN ORGANIZED HEALTH CARE EDUCATION/TRAINING PROGRAM

## 2025-06-30 NOTE — PROGRESS NOTES
Orthopaedic Spine Surgery Clinic Note    Patient ID: Moriah Quintanilla is a 89 y.o. female.    Chief Complaint  Chief Complaint   Patient presents with    Neck - Pain       History of Present Illness  Moriah Quintanilla is a 89-year-old female presenting with her daughter in the office for chronic right sided neck pain.  For the past approximately 1 year she has been experiencing localized right-sided neck pain and mechanical crepitus, worse with rotation of the neck to the right.  She was evaluated 1 year ago by DEBO Jones and was recommended physical therapy in the setting of cervical arthritis.  Ms. Quintanilla did not move forward with therapy at that time, she was unaware of this recommendation.  She currently denies any numbness or tingling of the bilateral upper extremities.  She denies any issues with bowel or bladder function, although with does endorse chronic urinary incontinence for which she uses Depends.  She denies issues with dexterity or hand weakness.  She does endorse some issues with smaller buttons, but is able to overall use her hands without considerable concern.  She does endorse some element of feeling off balance, requiring a wheeled walker and cane assistance for ambulation.    Prior treatments:  Tylenol, ibuprofen    Medical History[1]    Surgical History[2]    Social History[3]    Family History[4]    Allergies[5]    Current Outpatient Medications   Medication Instructions    amLODIPine (NORVASC) 2.5 mg, oral, Daily    cholecalciferol (VITAMIN D-3) 2,000 Units    incontinence pad, liner, disp pad 1 each, miscellaneous, 4 times daily PRN    levothyroxine (SYNTHROID, LEVOXYL) 125 mcg, oral, Daily, Take on an empty stomach 30 to 60 minutes prior to breakfast---DUE FOR LABS    nutritional drink (Ensure) liquid DRINK ONE CAN IN THE MORNING AND ONE IN THE EVENING    rosuvastatin (CRESTOR) 10 mg, oral, Daily         Ortho Exam  General: AO x 3, NAD  Cardio: examined extremities perfused by peripheral  palpation  Resp: breathing unlabored  Gait: within normal limits, non-antalgic  Tenderness: Tenderness to palpation of right lateral neck   ROM: Pain with rotation of neck. No pain with forward flexion, extension, or side bending    Upper Extremity  Right  Left  Deltoid   5/5  5/5  Biceps   5/5  5/5  Triceps   5/5  5/5  Wrist extension  5/5  5/5     5/5  5/5  Intrinsics  5/5  5/5      Sensation: Normal to light touch throughout upper and lower extremities bilaterally.    Reflexes:  Right   Left  Bi  2+   2+  Tri  2+   2+  BR  2+  2+      Reaves: negative  IBR: negative    Diagnostic Results - Imaging    XR cervical spine today, 6/30/2025  Official read pending.  New upright AP, lateral, flexion, and extension radiographs of the cervical spine were obtained in the office today.  These images are of good quality.  Demonstrate in these views are multilevel spondylotic changes.  There is disc degeneration with anterior osteophytosis throughout the subaxial cervical spine.  There is multilevel facet arthropathy throughout the cervical spine as well.  No acute fracture or dislocation.  There is a grade 1 spondylolisthesis at C6-7.  Prevertebral soft tissues appear normal.        CT cervical spine 9/19/2024:  ALIGNMENT:  The midcervical lordosis is straightened. Mild anterolisthesis at  C6-7 slightly increased since the previous exam.      VERTEBRAE/DISC SPACES:  No compression deformity or acute displaced fracture.  Multilevel  degenerative changes including atlantoaxial joint space narrowing  with adjacent spurring and calcifications, cystic changes in the  dens, multilevel intervertebral disc space narrowing with endplate  sclerosis and spurring, multilevel intervertebral disc calcifications  and multilevel bilateral facet joint narrowing and spurring, overall  slightly progressed since the prior exam.      ADDITIONAL FINDINGS:  No abnormal thickening of the prevertebral soft tissues.  Patchy  presumed scarring in  both lung apices.      IMPRESSION:  No cervical vertebral compression deformity or acute displaced  fracture. Multilevel productive/degenerative changes with mild  spondylolisthesis at C6-7 slightly increased since 05/11/2017.      Diagnosis  Encounter Diagnoses   Name Primary?    Cervical spondylosis     Neck pain           Assessment/Plan  Moriah Quintanilla is a 89-year-old female presenting with her daughter in the office for chronic right sided neck pain particularly worse over the past year.  She also endorses mechanical crepitus and pain specifically with rotation of her neck to the right.  She denies any radiculopathy symptoms of the upper extremities bilaterally.  She denies any significant issues with upper extremity dexterity or hand weakness, although does endorse some mild symptoms of fine motor dysfunction, for instance buttoning very small buttons.  She is able to turn a doorknob, handle loose change, and open jars effectively.  She does have chronic urinary incontinence for which she uses  Depends.  She is also using a wheeled walker as well as cane assistance for ambulation.  She was evaluated 1 year ago for these discomforts, but did not end up following through with recommended physical therapy.  She has not had any formal treatment for her neck pain.  She has been managing her symptoms medically with ibuprofen and Tylenol.    I had an extensive discussion in the office today with the patient with regards to her symptoms, her imaging findings, and possible management options.  We discussed how her imaging findings, which I personally interpreted and reviewed, does demonstrate multilevel spondylotic changes with facet arthropathy, disc degeneration, and osteophytosis.  We discussed how these multifocal degenerative changes can lead to axial neck pain.  We also discussed the differential including some component of muscular discomforts as well.  She does not have any significant radiculopathic complaints.   Some of her symptoms, such as chronic urinary incontinence and gait imbalance, may be age-appropriate.  She does not exhibit any objective neurologic deficits and does not seem to be significantly weak or numb with her upper extremities.  To that extent, we discussed that she likely does not have a significant myelopathic consideration as well.  Regardless, given her age, we did discuss proceeding with conservative management for her axial neck pain.  We did discuss referral to physical therapy for a neck stretching and strengthening program.  We also discussed utilizing topical menthol-based and anti-inflammatory products as adjunctive agents to tylenol/ibuprofen to address her discomforts.     At this juncture, patient can follow-up with our office on an as-needed basis if her symptoms fail to improve or worsen. After our discussion, the patient articulated understanding of the plan and felt that all questions had been answered satisfactorily. The patient was pleased with the visit and very appreciative for the care rendered.    **Please excuse any errors in grammar or translation related to this dictation. Voice recognition software was utilized to prepare this document. **      F/U PRN    Laura Sparks PA-C  Orthopedic Spine Surgery    Orders Placed This Encounter   Procedures    Referral to Physical Therapy          I reviewed the PA's documentation and discussed the patient with the PA. I agree with the PA's medical decision making as documented in the note.     --    Naman Rdz MD  Orthopaedic Spine Surgery  , Department of Orthopaedic Surgery  [facility]             [1]   Past Medical History:  Diagnosis Date    Acute upper respiratory infection, unspecified 07/25/2019    Viral upper respiratory tract infection    Age-related osteoporosis without current pathological fracture 02/25/2021    Age related osteoporosis    Encounter for examination of  ears and hearing without abnormal findings 09/30/2019    Encounter for audiology evaluation    Encounter for immunization     Encounter for immunization    Encounter for screening mammogram for malignant neoplasm of breast 04/25/2017    Screening mammogram for high-risk patient    Localized edema 04/11/2022    Unilateral edema of lower extremity    Localized edema 05/19/2022    Leg edema    Obstructive sleep apnea (adult) (pediatric) 01/13/2020    MODESTO on CPAP    Olecranon bursitis, unspecified elbow 11/29/2020    Olecranon bursitis    Other specified abnormal findings of blood chemistry 12/30/2021    Abnormal thyroid blood test    Pain in unspecified knee 10/18/2022    Knee pain    Personal history of other diseases of the nervous system and sense organs 07/28/2019    History of conjunctivitis    Personal history of other drug therapy 12/05/2018    History of pneumococcal vaccination    Personal history of other drug therapy 10/13/2016    History of influenza vaccination    Personal history of other drug therapy 10/26/2018    History of influenza vaccination    Personal history of other endocrine, nutritional and metabolic disease 04/25/2013    History of hypothyroidism    Personal history of other infectious and parasitic diseases 04/12/2020    History of Clostridioides difficile colitis    Personal history of other mental and behavioral disorders 04/25/2013    History of depression    Personal history of other specified conditions 09/21/2016    History of urinary urgency    Personal history of other specified conditions 04/02/2020    History of paresthesia    Personal history of other specified conditions 03/22/2021    History of palpitations    Personal history of urinary (tract) infections 06/18/2018    History of urinary tract infection    Personal history of urinary (tract) infections 08/27/2021    History of urinary tract infection    Personal history of urinary (tract) infections 03/12/2013    History of  urinary tract infection    Rash and other nonspecific skin eruption 10/13/2016    Rash    Strain of muscle, fascia and tendon at neck level, initial encounter 01/15/2019    Neck strain    Strain of right quadriceps muscle, fascia and tendon, initial encounter 10/17/2022    Strain of quadriceps, right, initial encounter    Unilateral post-traumatic osteoarthritis, left knee 10/31/2018    Post-traumatic osteoarthritis of left knee    Unspecified fall, initial encounter 10/31/2022    Fall, accidental    Unspecified internal derangement of left knee 04/02/2020    Internal derangement of left knee    Zoster without complications 10/06/2015    Herpes zoster without complication   [2]   Past Surgical History:  Procedure Laterality Date    ANKLE SURGERY  07/18/2016    Ankle Surgery    OTHER SURGICAL HISTORY  12/05/2018    Tonsillectomy   [3]   Social History  Socioeconomic History    Marital status:    Tobacco Use    Smoking status: Never    Smokeless tobacco: Never   Vaping Use    Vaping status: Never Used   Substance and Sexual Activity    Alcohol use: Yes     Alcohol/week: 1.0 standard drink of alcohol     Types: 1 Glasses of wine per week     Comment: rare small glass of blackberry Manischevits    Drug use: Never   [4]   Family History  Problem Relation Name Age of Onset    Diabetes Mother      Coronary artery disease Father      Diabetes Brother     [5] No Known Allergies

## 2025-07-11 DIAGNOSIS — E03.9 HYPOTHYROIDISM, UNSPECIFIED TYPE: ICD-10-CM

## 2025-07-11 LAB — TSH SERPL-ACNC: 0.5 MIU/L (ref 0.4–4.5)

## 2025-07-11 NOTE — TELEPHONE ENCOUNTER
Pt called the office, left a vm 7/11/25 stating that her Levothyroxine couldn't be refilled until she completed her blood work.  Pt completed her blood work 7/10/25 so she wanted to know if you could send her Levothyroxine 125 mcg to her pharmacy?  Pt stated that she didn't receive the Levothyroxine that was sent 6/18/25.  The pt's last ov was 12/11/24.

## 2025-07-12 DIAGNOSIS — E03.9 HYPOTHYROIDISM, UNSPECIFIED TYPE: ICD-10-CM

## 2025-07-15 RX ORDER — LEVOTHYROXINE SODIUM 125 UG/1
125 TABLET ORAL DAILY
Qty: 90 TABLET | Refills: 1 | Status: SHIPPED | OUTPATIENT
Start: 2025-07-15

## 2025-07-18 ENCOUNTER — OFFICE VISIT (OUTPATIENT)
Dept: ORTHOPEDIC SURGERY | Facility: CLINIC | Age: OVER 89
End: 2025-07-18
Payer: MEDICARE

## 2025-07-18 DIAGNOSIS — M17.11 PRIMARY OSTEOARTHRITIS OF RIGHT KNEE: Primary | ICD-10-CM

## 2025-07-18 PROCEDURE — 1125F AMNT PAIN NOTED PAIN PRSNT: CPT | Performed by: STUDENT IN AN ORGANIZED HEALTH CARE EDUCATION/TRAINING PROGRAM

## 2025-07-18 PROCEDURE — 1159F MED LIST DOCD IN RCRD: CPT | Performed by: STUDENT IN AN ORGANIZED HEALTH CARE EDUCATION/TRAINING PROGRAM

## 2025-07-18 ASSESSMENT — PAIN DESCRIPTION - DESCRIPTORS: DESCRIPTORS: ACHING;TIGHTNESS

## 2025-07-18 ASSESSMENT — PAIN SCALES - GENERAL: PAINLEVEL_OUTOF10: 3

## 2025-07-18 ASSESSMENT — PAIN - FUNCTIONAL ASSESSMENT: PAIN_FUNCTIONAL_ASSESSMENT: 0-10

## 2025-07-18 NOTE — PROGRESS NOTES
ORTHOPAEDIC SURGERY OUTPATIENT PROGRESS NOTE    Chief Complaint:  Right knee pain    History Of Present Illness  Moriah Quintanilla is a 89 y.o. female who presents for evaluation of right knee pain.  Patient was initially referred for ankle pain but referred to discuss her knee.  Typical pain is reported as 3 out of 10.  Pain is made worse with walking as well as standing getting up from a seated position.  Patient has had injections in the past with the most recent one being over 2 years ago.  Patient reports of most recent injection was not particularly helpful.  She ambulates with the use of a cane or walker.    07/18/2025: Patient returns for follow-up of her right knee pain.  She is reporting 3 out of 10 pain but more typical stiffness.  She is overall much improved from prior examination.  The patient is traveling to the [place] for 2 weeks.     Past Medical History  Medical History[1]    Surgical History  Recent Surgeries in Orthopaedic Surgery            No cases to display             Social History  Social History[2]    Family History  Family History[3]     Allergies  RX Allergies[4]    Review of Systems  REVIEW OF SYSTEMS  Constitutional: no unplanned weight loss  Psychiatric: no suicidal ideation  ENT: no vision changes, no sinus problems  Pulmonary: no shortness of breath  Lymphatic: no enlarged lymph nodes  Cardiovascular: no chest pain or shortness of breath  Gastrointestinal: no stomach problems  Genitourinary: no dysuria   Skin: no rashes  Endocrine: no thyroid problems  Neurological: no headache, no numbness  Hematological: no easy bruising  Musculoskeletal: Right knee pain     Physical Exam  PHYSICAL EXAMINATION  Constitutional Exam: well developed and well nourished  Psychiatric Exam: alert and oriented, appropriate mood and behavior  Eye Exam: EOMI  Pulmonary Exam: breathing non-labored, no apparent distress  Lymphatic exam: no appreciable lymphadenopathy in the lower  extremities  Cardiovascular exam: RRR to peripheral palpation, DP pulses 2+, PT 2+, toes are pink with good capillary refill, no pitting edema  Skin exam: no open lesions, rashes, abrasions or ulcerations  Neurological exam: sensation to light touch intact in both lower extremities in peripheral and dermatomal distributions (except for any abnormalities noted in musculoskeletal exam)    Musculoskeletal exam: No formal examination performed.     Last Recorded Vitals  not currently breastfeeding.    Laboratory Results  No results found for this or any previous visit (from the past 24 hours).     Radiology Results  No new imaging at this visit.    Assessment/Plan:  89-year-old female who in my impression has right knee pain secondary to osteoarthritis.  I have reviewed the diagnosis and treatment options extensively with the patient.  I would recommend the patient continue weightbearing to her tolerance in her right lower extremity.  Given the absence of severe pain and with respect to the patient's upcoming travel plans, would not recommend proceeding with corticosteroid injection today.  I would plan to see the patient back in 1 month for consideration thereof. Upon return patient would not require further imaging.    Lobo Dubois MD, MATT  Department of Orthopaedic Surgery  [facility]    The diagnosis and treatment plan were reviewed with the patient. All questions were answered. The patient verbalized understanding of the treatment plan. There were no barriers to understanding identified.    Note dictated with Gobble software.  Completed without full type editing and sent to avoid delay.         [1]   Past Medical History:  Diagnosis Date    Acute upper respiratory infection, unspecified 07/25/2019    Viral upper respiratory tract infection    Age-related osteoporosis without current pathological fracture 02/25/2021    Age related osteoporosis     Encounter for examination of ears and hearing without abnormal findings 09/30/2019    Encounter for audiology evaluation    Encounter for immunization     Encounter for immunization    Encounter for screening mammogram for malignant neoplasm of breast 04/25/2017    Screening mammogram for high-risk patient    Localized edema 04/11/2022    Unilateral edema of lower extremity    Localized edema 05/19/2022    Leg edema    Obstructive sleep apnea (adult) (pediatric) 01/13/2020    MODESTO on CPAP    Olecranon bursitis, unspecified elbow 11/29/2020    Olecranon bursitis    Other specified abnormal findings of blood chemistry 12/30/2021    Abnormal thyroid blood test    Pain in unspecified knee 10/18/2022    Knee pain    Personal history of other diseases of the nervous system and sense organs 07/28/2019    History of conjunctivitis    Personal history of other drug therapy 12/05/2018    History of pneumococcal vaccination    Personal history of other drug therapy 10/13/2016    History of influenza vaccination    Personal history of other drug therapy 10/26/2018    History of influenza vaccination    Personal history of other endocrine, nutritional and metabolic disease 04/25/2013    History of hypothyroidism    Personal history of other infectious and parasitic diseases 04/12/2020    History of Clostridioides difficile colitis    Personal history of other mental and behavioral disorders 04/25/2013    History of depression    Personal history of other specified conditions 09/21/2016    History of urinary urgency    Personal history of other specified conditions 04/02/2020    History of paresthesia    Personal history of other specified conditions 03/22/2021    History of palpitations    Personal history of urinary (tract) infections 06/18/2018    History of urinary tract infection    Personal history of urinary (tract) infections 08/27/2021    History of urinary tract infection    Personal history of urinary (tract) infections  03/12/2013    History of urinary tract infection    Rash and other nonspecific skin eruption 10/13/2016    Rash    Strain of muscle, fascia and tendon at neck level, initial encounter 01/15/2019    Neck strain    Strain of right quadriceps muscle, fascia and tendon, initial encounter 10/17/2022    Strain of quadriceps, right, initial encounter    Unilateral post-traumatic osteoarthritis, left knee 10/31/2018    Post-traumatic osteoarthritis of left knee    Unspecified fall, initial encounter 10/31/2022    Fall, accidental    Unspecified internal derangement of left knee 04/02/2020    Internal derangement of left knee    Zoster without complications 10/06/2015    Herpes zoster without complication   [2]   Social History  Socioeconomic History    Marital status:    Tobacco Use    Smoking status: Never    Smokeless tobacco: Never   Vaping Use    Vaping status: Never Used   Substance and Sexual Activity    Alcohol use: Yes     Alcohol/week: 1.0 standard drink of alcohol     Types: 1 Glasses of wine per week     Comment: rare small glass of blackberry Manischevits    Drug use: Never   [3]   Family History  Problem Relation Name Age of Onset    Diabetes Mother      Coronary artery disease Father      Diabetes Brother     [4] No Known Allergies

## 2025-08-28 DIAGNOSIS — E78.5 HYPERLIPIDEMIA, UNSPECIFIED HYPERLIPIDEMIA TYPE: ICD-10-CM

## 2025-08-29 ENCOUNTER — APPOINTMENT (OUTPATIENT)
Dept: RADIOLOGY | Facility: HOSPITAL | Age: OVER 89
End: 2025-08-29
Payer: MEDICARE

## 2025-08-29 ENCOUNTER — APPOINTMENT (OUTPATIENT)
Dept: CARDIOLOGY | Facility: HOSPITAL | Age: OVER 89
End: 2025-08-29
Payer: MEDICARE

## 2025-08-29 ENCOUNTER — HOSPITAL ENCOUNTER (OUTPATIENT)
Facility: HOSPITAL | Age: OVER 89
Setting detail: OBSERVATION
Discharge: HOME | End: 2025-08-30
Attending: EMERGENCY MEDICINE | Admitting: INTERNAL MEDICINE
Payer: MEDICARE

## 2025-08-29 PROBLEM — H53.9 VISUAL DISTURBANCE: Status: ACTIVE | Noted: 2025-08-29

## 2025-08-29 PROBLEM — I63.81 CEREBROVASCULAR ACCIDENT (CVA) DUE TO OCCLUSION OF SMALL ARTERY (MULTI): Status: ACTIVE | Noted: 2025-08-29

## 2025-08-29 PROBLEM — H34.211: Status: ACTIVE | Noted: 2025-08-29

## 2025-08-29 RX ORDER — ROSUVASTATIN CALCIUM 10 MG/1
10 TABLET, COATED ORAL DAILY
Qty: 90 TABLET | Refills: 0 | Status: SHIPPED | OUTPATIENT
Start: 2025-08-29 | End: 2025-08-30 | Stop reason: HOSPADM

## 2025-08-29 SDOH — SOCIAL STABILITY: SOCIAL INSECURITY: ARE YOU OR HAVE YOU BEEN THREATENED OR ABUSED PHYSICALLY, EMOTIONALLY, OR SEXUALLY BY ANYONE?: NO

## 2025-08-29 SDOH — SOCIAL STABILITY: SOCIAL INSECURITY
WITHIN THE LAST YEAR, HAVE YOU BEEN RAPED OR FORCED TO HAVE ANY KIND OF SEXUAL ACTIVITY BY YOUR PARTNER OR EX-PARTNER?: NO

## 2025-08-29 SDOH — ECONOMIC STABILITY: HOUSING INSECURITY: IN THE LAST 12 MONTHS, WAS THERE A TIME WHEN YOU WERE NOT ABLE TO PAY THE MORTGAGE OR RENT ON TIME?: NO

## 2025-08-29 SDOH — ECONOMIC STABILITY: FOOD INSECURITY: WITHIN THE PAST 12 MONTHS, THE FOOD YOU BOUGHT JUST DIDN'T LAST AND YOU DIDN'T HAVE MONEY TO GET MORE.: NEVER TRUE

## 2025-08-29 SDOH — SOCIAL STABILITY: SOCIAL INSECURITY: WITHIN THE LAST YEAR, HAVE YOU BEEN AFRAID OF YOUR PARTNER OR EX-PARTNER?: NO

## 2025-08-29 SDOH — ECONOMIC STABILITY: HOUSING INSECURITY: AT ANY TIME IN THE PAST 12 MONTHS, WERE YOU HOMELESS OR LIVING IN A SHELTER (INCLUDING NOW)?: NO

## 2025-08-29 SDOH — ECONOMIC STABILITY: FOOD INSECURITY: HOW HARD IS IT FOR YOU TO PAY FOR THE VERY BASICS LIKE FOOD, HOUSING, MEDICAL CARE, AND HEATING?: NOT HARD AT ALL

## 2025-08-29 SDOH — SOCIAL STABILITY: SOCIAL INSECURITY: DO YOU FEEL ANYONE HAS EXPLOITED OR TAKEN ADVANTAGE OF YOU FINANCIALLY OR OF YOUR PERSONAL PROPERTY?: NO

## 2025-08-29 SDOH — HEALTH STABILITY: MENTAL HEALTH: HOW MANY DRINKS CONTAINING ALCOHOL DO YOU HAVE ON A TYPICAL DAY WHEN YOU ARE DRINKING?: PATIENT DOES NOT DRINK

## 2025-08-29 SDOH — HEALTH STABILITY: MENTAL HEALTH: HOW OFTEN DO YOU HAVE SIX OR MORE DRINKS ON ONE OCCASION?: NEVER

## 2025-08-29 SDOH — ECONOMIC STABILITY: INCOME INSECURITY: IN THE PAST 12 MONTHS HAS THE ELECTRIC, GAS, OIL, OR WATER COMPANY THREATENED TO SHUT OFF SERVICES IN YOUR HOME?: NO

## 2025-08-29 SDOH — ECONOMIC STABILITY: TRANSPORTATION INSECURITY: IN THE PAST 12 MONTHS, HAS LACK OF TRANSPORTATION KEPT YOU FROM MEDICAL APPOINTMENTS OR FROM GETTING MEDICATIONS?: NO

## 2025-08-29 SDOH — SOCIAL STABILITY: SOCIAL INSECURITY: HAVE YOU HAD THOUGHTS OF HARMING ANYONE ELSE?: NO

## 2025-08-29 SDOH — SOCIAL STABILITY: SOCIAL INSECURITY
WITHIN THE LAST YEAR, HAVE YOU BEEN KICKED, HIT, SLAPPED, OR OTHERWISE PHYSICALLY HURT BY YOUR PARTNER OR EX-PARTNER?: NO

## 2025-08-29 SDOH — HEALTH STABILITY: MENTAL HEALTH: HOW OFTEN DO YOU HAVE A DRINK CONTAINING ALCOHOL?: NEVER

## 2025-08-29 SDOH — ECONOMIC STABILITY: FOOD INSECURITY: WITHIN THE PAST 12 MONTHS, YOU WORRIED THAT YOUR FOOD WOULD RUN OUT BEFORE YOU GOT THE MONEY TO BUY MORE.: NEVER TRUE

## 2025-08-29 SDOH — SOCIAL STABILITY: SOCIAL INSECURITY: WERE YOU ABLE TO COMPLETE ALL THE BEHAVIORAL HEALTH SCREENINGS?: YES

## 2025-08-29 SDOH — SOCIAL STABILITY: SOCIAL INSECURITY: ABUSE: ADULT

## 2025-08-29 SDOH — SOCIAL STABILITY: SOCIAL INSECURITY: WITHIN THE LAST YEAR, HAVE YOU BEEN HUMILIATED OR EMOTIONALLY ABUSED IN OTHER WAYS BY YOUR PARTNER OR EX-PARTNER?: NO

## 2025-08-29 SDOH — SOCIAL STABILITY: SOCIAL INSECURITY: HAVE YOU HAD ANY THOUGHTS OF HARMING ANYONE ELSE?: NO

## 2025-08-29 SDOH — SOCIAL STABILITY: SOCIAL INSECURITY: DOES ANYONE TRY TO KEEP YOU FROM HAVING/CONTACTING OTHER FRIENDS OR DOING THINGS OUTSIDE YOUR HOME?: NO

## 2025-08-29 SDOH — ECONOMIC STABILITY: HOUSING INSECURITY: IN THE PAST 12 MONTHS, HOW MANY TIMES HAVE YOU MOVED WHERE YOU WERE LIVING?: 0

## 2025-08-29 SDOH — SOCIAL STABILITY: SOCIAL INSECURITY: DO YOU FEEL UNSAFE GOING BACK TO THE PLACE WHERE YOU ARE LIVING?: NO

## 2025-08-29 SDOH — SOCIAL STABILITY: SOCIAL INSECURITY: HAS ANYONE EVER THREATENED TO HURT YOUR FAMILY OR YOUR PETS?: NO

## 2025-08-29 SDOH — SOCIAL STABILITY: SOCIAL INSECURITY: ARE THERE ANY APPARENT SIGNS OF INJURIES/BEHAVIORS THAT COULD BE RELATED TO ABUSE/NEGLECT?: NO

## 2025-08-29 ASSESSMENT — ACTIVITIES OF DAILY LIVING (ADL)
GROOMING: INDEPENDENT
HEARING - LEFT EAR: DIFFICULTY WITH NOISE
WALKS IN HOME: INDEPENDENT
PATIENT'S MEMORY ADEQUATE TO SAFELY COMPLETE DAILY ACTIVITIES?: YES
ADEQUATE_TO_COMPLETE_ADL: YES
BATHING: INDEPENDENT
DRESSING YOURSELF: INDEPENDENT
FEEDING YOURSELF: INDEPENDENT
JUDGMENT_ADEQUATE_SAFELY_COMPLETE_DAILY_ACTIVITIES: YES
HEARING - RIGHT EAR: DIFFICULTY WITH NOISE
LACK_OF_TRANSPORTATION: NO
TOILETING: INDEPENDENT

## 2025-08-29 ASSESSMENT — COGNITIVE AND FUNCTIONAL STATUS - GENERAL
DAILY ACTIVITIY SCORE: 24
MOBILITY SCORE: 24
PATIENT BASELINE BEDBOUND: NO

## 2025-08-29 ASSESSMENT — PAIN SCALES - GENERAL
PAINLEVEL_OUTOF10: 0 - NO PAIN

## 2025-08-29 ASSESSMENT — PAIN - FUNCTIONAL ASSESSMENT
PAIN_FUNCTIONAL_ASSESSMENT: 0-10
PAIN_FUNCTIONAL_ASSESSMENT: 0-10

## 2025-08-29 ASSESSMENT — PATIENT HEALTH QUESTIONNAIRE - PHQ9
2. FEELING DOWN, DEPRESSED OR HOPELESS: NOT AT ALL
1. LITTLE INTEREST OR PLEASURE IN DOING THINGS: NOT AT ALL
SUM OF ALL RESPONSES TO PHQ9 QUESTIONS 1 & 2: 0

## 2025-08-29 ASSESSMENT — LIFESTYLE VARIABLES
AUDIT-C TOTAL SCORE: 0
SKIP TO QUESTIONS 9-10: 1

## 2025-08-30 ENCOUNTER — APPOINTMENT (OUTPATIENT)
Dept: CARDIOLOGY | Facility: HOSPITAL | Age: OVER 89
End: 2025-08-30
Payer: MEDICARE

## 2025-08-30 ASSESSMENT — COGNITIVE AND FUNCTIONAL STATUS - GENERAL
TOILETING: A LITTLE
DAILY ACTIVITIY SCORE: 23
MOBILITY SCORE: 24

## 2025-08-30 ASSESSMENT — PAIN - FUNCTIONAL ASSESSMENT: PAIN_FUNCTIONAL_ASSESSMENT: 0-10

## 2025-08-30 ASSESSMENT — PAIN SCALES - GENERAL: PAINLEVEL_OUTOF10: 0 - NO PAIN

## 2025-09-02 ENCOUNTER — APPOINTMENT (OUTPATIENT)
Dept: ORTHOPEDIC SURGERY | Facility: CLINIC | Age: OVER 89
End: 2025-09-02
Payer: MEDICARE

## 2025-09-12 ENCOUNTER — APPOINTMENT (OUTPATIENT)
Dept: ORTHOPEDIC SURGERY | Facility: CLINIC | Age: OVER 89
End: 2025-09-12
Payer: MEDICARE

## 2025-09-24 ENCOUNTER — APPOINTMENT (OUTPATIENT)
Dept: SLEEP MEDICINE | Facility: CLINIC | Age: OVER 89
End: 2025-09-24
Payer: MEDICARE

## 2025-10-09 ENCOUNTER — APPOINTMENT (OUTPATIENT)
Dept: SLEEP MEDICINE | Facility: HOSPITAL | Age: OVER 89
End: 2025-10-09
Payer: MEDICARE

## 2025-10-28 ENCOUNTER — APPOINTMENT (OUTPATIENT)
Dept: PRIMARY CARE | Facility: CLINIC | Age: OVER 89
End: 2025-10-28
Payer: MEDICARE

## 2025-12-08 ENCOUNTER — APPOINTMENT (OUTPATIENT)
Dept: PRIMARY CARE | Facility: CLINIC | Age: OVER 89
End: 2025-12-08
Payer: MEDICARE